# Patient Record
Sex: MALE | Race: WHITE
[De-identification: names, ages, dates, MRNs, and addresses within clinical notes are randomized per-mention and may not be internally consistent; named-entity substitution may affect disease eponyms.]

---

## 2022-03-28 ENCOUNTER — HOSPITAL ENCOUNTER (EMERGENCY)
Dept: HOSPITAL 95 - ER | Age: 82
Discharge: HOME | End: 2022-03-28
Payer: MEDICARE

## 2022-03-28 VITALS — HEIGHT: 68 IN | WEIGHT: 164.99 LBS | BODY MASS INDEX: 25.01 KG/M2

## 2022-03-28 DIAGNOSIS — F91.8: Primary | ICD-10-CM

## 2022-03-28 DIAGNOSIS — Z59.00: ICD-10-CM

## 2022-03-28 LAB
ALBUMIN SERPL BCP-MCNC: 3.7 G/DL (ref 3.4–5)
ALBUMIN/GLOB SERPL: 1.2 {RATIO} (ref 0.8–1.8)
ALT SERPL W P-5'-P-CCNC: 23 U/L (ref 12–78)
ANION GAP SERPL CALCULATED.4IONS-SCNC: 6 MMOL/L (ref 6–16)
APAP SERPL-MCNC: <2 UG/ML (ref 10–30)
AST SERPL W P-5'-P-CCNC: 37 U/L (ref 12–37)
BASOPHILS # BLD AUTO: 0.05 K/MM3 (ref 0–0.23)
BASOPHILS NFR BLD AUTO: 0 % (ref 0–2)
BILIRUB SERPL-MCNC: 1.7 MG/DL (ref 0.1–1)
BUN SERPL-MCNC: 13 MG/DL (ref 8–24)
CALCIUM SERPL-MCNC: 9.1 MG/DL (ref 8.5–10.1)
CHLORIDE SERPL-SCNC: 103 MMOL/L (ref 98–108)
CO2 SERPL-SCNC: 29 MMOL/L (ref 21–32)
CREAT SERPL-MCNC: 1.02 MG/DL (ref 0.6–1.2)
DEPRECATED RDW RBC AUTO: 49.1 FL (ref 35.1–46.3)
EOSINOPHIL # BLD AUTO: 0.01 K/MM3 (ref 0–0.68)
EOSINOPHIL NFR BLD AUTO: 0 % (ref 0–6)
ERYTHROCYTE [DISTWIDTH] IN BLOOD BY AUTOMATED COUNT: 13.3 % (ref 11.7–14.2)
ETHANOL SERPL-MCNC: <3 MG/DL
GLOBULIN SER CALC-MCNC: 3 G/DL (ref 2.2–4)
GLUCOSE SERPL-MCNC: 100 MG/DL (ref 70–99)
HCT VFR BLD AUTO: 45.9 % (ref 37–53)
HGB BLD-MCNC: 15.1 G/DL (ref 13.5–17.5)
IMM GRANULOCYTES # BLD AUTO: 0.06 K/MM3 (ref 0–0.1)
IMM GRANULOCYTES NFR BLD AUTO: 1 % (ref 0–1)
LYMPHOCYTES # BLD AUTO: 1.06 K/MM3 (ref 0.84–5.2)
LYMPHOCYTES NFR BLD AUTO: 8 % (ref 21–46)
MCHC RBC AUTO-ENTMCNC: 32.9 G/DL (ref 31.5–36.5)
MCV RBC AUTO: 100 FL (ref 80–100)
MONOCYTES # BLD AUTO: 1.21 K/MM3 (ref 0.16–1.47)
MONOCYTES NFR BLD AUTO: 10 % (ref 4–13)
NEUTROPHILS # BLD AUTO: 10.21 K/MM3 (ref 1.96–9.15)
NEUTROPHILS NFR BLD AUTO: 81 % (ref 41–73)
NRBC # BLD AUTO: 0 K/MM3 (ref 0–0.02)
NRBC BLD AUTO-RTO: 0 /100 WBC (ref 0–0.2)
PLATELET # BLD AUTO: 192 K/MM3 (ref 150–400)
POTASSIUM SERPL-SCNC: 3.9 MMOL/L (ref 3.5–5.5)
PROT SERPL-MCNC: 6.7 G/DL (ref 6.4–8.2)
PROT UR STRIP-MCNC: (no result) MG/DL
RBC #/AREA URNS HPF: (no result) /HPF (ref 0–2)
SALICYLATES SERPL-MCNC: <1.7 MG/DL (ref 2.8–20)
SODIUM SERPL-SCNC: 138 MMOL/L (ref 136–145)
SP GR SPEC: 1.01 (ref 1–1.02)
UROBILINOGEN UR STRIP-MCNC: (no result) MG/DL
WBC #/AREA URNS HPF: (no result) /HPF (ref 0–5)

## 2022-03-28 PROCEDURE — G0480 DRUG TEST DEF 1-7 CLASSES: HCPCS

## 2022-03-28 SDOH — ECONOMIC STABILITY - HOUSING INSECURITY: HOMELESSNESS UNSPECIFIED: Z59.00

## 2022-03-29 ENCOUNTER — HOSPITAL ENCOUNTER (INPATIENT)
Dept: HOSPITAL 95 - ER | Age: 82
LOS: 44 days | DRG: 177 | End: 2022-05-12
Attending: FAMILY MEDICINE | Admitting: INTERNAL MEDICINE
Payer: MEDICARE

## 2022-03-29 VITALS — BODY MASS INDEX: 19.56 KG/M2 | HEIGHT: 66 IN | WEIGHT: 121.7 LBS

## 2022-03-29 DIAGNOSIS — Z28.21: ICD-10-CM

## 2022-03-29 DIAGNOSIS — J96.01: ICD-10-CM

## 2022-03-29 DIAGNOSIS — E83.39: ICD-10-CM

## 2022-03-29 DIAGNOSIS — F02.81: ICD-10-CM

## 2022-03-29 DIAGNOSIS — Z66: ICD-10-CM

## 2022-03-29 DIAGNOSIS — I50.30: ICD-10-CM

## 2022-03-29 DIAGNOSIS — D69.6: ICD-10-CM

## 2022-03-29 DIAGNOSIS — G92.8: ICD-10-CM

## 2022-03-29 DIAGNOSIS — G31.83: ICD-10-CM

## 2022-03-29 DIAGNOSIS — I35.0: ICD-10-CM

## 2022-03-29 DIAGNOSIS — Z51.5: ICD-10-CM

## 2022-03-29 DIAGNOSIS — M62.82: ICD-10-CM

## 2022-03-29 DIAGNOSIS — Z78.1: ICD-10-CM

## 2022-03-29 DIAGNOSIS — J69.0: Primary | ICD-10-CM

## 2022-03-29 DIAGNOSIS — I95.9: ICD-10-CM

## 2022-03-29 DIAGNOSIS — Z59.00: ICD-10-CM

## 2022-03-29 LAB
RBC #/AREA URNS HPF: (no result) /HPF (ref 0–2)
SP GR SPEC: 1.02 (ref 1–1.02)
UROBILINOGEN UR STRIP-MCNC: (no result) MG/DL
WBC #/AREA URNS HPF: (no result) /HPF (ref 0–5)

## 2022-03-29 PROCEDURE — G0378 HOSPITAL OBSERVATION PER HR: HCPCS

## 2022-03-29 PROCEDURE — A9270 NON-COVERED ITEM OR SERVICE: HCPCS

## 2022-03-29 SDOH — ECONOMIC STABILITY - HOUSING INSECURITY: HOMELESSNESS UNSPECIFIED: Z59.00

## 2022-03-30 NOTE — NUR
PT ARRIVED TO THE MEDICAL FLOOR FROM THE ER VIA WHEELCHAIR, A/OX3. PT IS
ADMIITED  FOR SAFE PLACEMENT. THE PT WAS ORIENTED TO THE ROOM LAYOUT AND
CALL SYSTEM. CALL LIGHT IN REACH. PT SO FAR HAS BEEN COOPERATIVE WITH STAYING.
THE PTS DAUGHTER WAS IN EARLIER WITH PAPERS FROM APS FOR THE PT TO SIGN. PT
APPEARS TO BE BREATHING EASILY ON RA AT THIS TIME. PT TOOK HIS BOOTS OFF FOR A
BED WEIGHT THEN PUT THEM RIGHT BACK ON. CALL LIGHT IN REACH. WILL CONTINUE TO
MONITOR AND ASSESS FOR CHANGES

## 2022-03-31 NOTE — NUR
PT CALM & COOPERATIVE THROUGHOUT SHIFT. EASILY REDIRECTED TO RETURN TO HIS OWN
ROOM WHEN NEEDED. REMAINED IN HIS OWN CLOTHES, REFUSING TO CHANGE INTO
HOSPITAL GOWN & HAVE HIS BLOOD DRAWN TODAY FOR LABS THAT MD ORDERED. MD
INITIATED GUARDIANSHIP TODAY. APS IN TODAY TO SEE PT. PT SIGNED [employer]
PAPERWORK THIS EVENING, COPY GIVEN TO PT. VSS TODAY. USES RESTROOM
INDEPENDENTLY.
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
## 2022-03-31 NOTE — NUR
IN ROOM TO DRAW MD ORDERED LABS. PT IS REFUSING BEING POKED. WILL TRY
AGAIN LATER PER PT AGREEMENT.

## 2022-03-31 NOTE — NUR
AGRESSIVE BEHAVIORS WHEN APPROACHED FOR VITAL SIGNS CHECK PT EXTREMELY
AGGITATED WITH BEEPING FROM MACHINE ALARMS & SQUEEZING OF bp CUFF & HE WAS
STRIKING OUT AT EQUIPMENT & STARTED YELLING DONT HURT HIM. HE HAD ALLOWED
VITAL SIGNS LAST NIGHT BUT HAD EXPRESSED FEAR & DISTRUST ABOUT LAB DRAWS & SO
STAFF WAS REMOVED FROM CLOSE CONTACT WITH PT & LAB DRAW NOT ATTEMPTED DUE TO
AGRESSIVE BEHAVIOR WITH VITALS. PT WAS REDIRECTABLE WITH CUES BUT HE TREN
STARTED HOWLING LOUDLY WHICH STOPPED WITH REDIRECTION. PT EXPRESSED PAIN BUT
DECLINE PAIN MEDS. PTS RT HAND KNUCKLES APPEAR SWOLLEN & BRUISED BUT HE HAS
BEEN USING IT. NOW RESTING QUIETLY ON BED. pt HAS NOT BEEN TAKING MUCH LIQUIDS
OR ANY ORAL INTAKE OBSERVED THIS 12 HOUR SHIFT. oFFERED WATER SUPPLEMENTS &
SET UP BUT TAKEN POORLY. POSTED SIGNS ASKING STAFF TO SPEAK WITH NURSE PRIOR
TO ENTERING DUE TO POTENTIAL FOR AGRESSION WITH VIOLENCE.

## 2022-04-01 LAB
ANION GAP SERPL CALCULATED.4IONS-SCNC: 3 MMOL/L (ref 6–16)
BASOPHILS # BLD AUTO: 0.06 K/MM3 (ref 0–0.23)
BASOPHILS NFR BLD AUTO: 1 % (ref 0–2)
BUN SERPL-MCNC: 15 MG/DL (ref 8–24)
CALCIUM SERPL-MCNC: 9 MG/DL (ref 8.5–10.1)
CHLORIDE SERPL-SCNC: 102 MMOL/L (ref 98–108)
CO2 SERPL-SCNC: 31 MMOL/L (ref 21–32)
CREAT SERPL-MCNC: 0.87 MG/DL (ref 0.6–1.2)
DEPRECATED RDW RBC AUTO: 49.1 FL (ref 35.1–46.3)
EOSINOPHIL # BLD AUTO: 0.22 K/MM3 (ref 0–0.68)
EOSINOPHIL NFR BLD AUTO: 3 % (ref 0–6)
ERYTHROCYTE [DISTWIDTH] IN BLOOD BY AUTOMATED COUNT: 13.2 % (ref 11.7–14.2)
GLUCOSE SERPL-MCNC: 111 MG/DL (ref 70–99)
HCT VFR BLD AUTO: 47.3 % (ref 37–53)
HGB BLD-MCNC: 16 G/DL (ref 13.5–17.5)
IMM GRANULOCYTES # BLD AUTO: 0.02 K/MM3 (ref 0–0.1)
IMM GRANULOCYTES NFR BLD AUTO: 0 % (ref 0–1)
LYMPHOCYTES # BLD AUTO: 2.35 K/MM3 (ref 0.84–5.2)
LYMPHOCYTES NFR BLD AUTO: 32 % (ref 21–46)
MCHC RBC AUTO-ENTMCNC: 33.8 G/DL (ref 31.5–36.5)
MCV RBC AUTO: 99 FL (ref 80–100)
MONOCYTES # BLD AUTO: 0.71 K/MM3 (ref 0.16–1.47)
MONOCYTES NFR BLD AUTO: 10 % (ref 4–13)
NEUTROPHILS # BLD AUTO: 4.1 K/MM3 (ref 1.96–9.15)
NEUTROPHILS NFR BLD AUTO: 55 % (ref 41–73)
NRBC # BLD AUTO: 0 K/MM3 (ref 0–0.02)
NRBC BLD AUTO-RTO: 0 /100 WBC (ref 0–0.2)
PLATELET # BLD AUTO: 229 K/MM3 (ref 150–400)
POTASSIUM SERPL-SCNC: 3.9 MMOL/L (ref 3.5–5.5)
SODIUM SERPL-SCNC: 136 MMOL/L (ref 136–145)
TSH SERPL DL<=0.005 MIU/L-ACNC: 0.97 UIU/ML (ref 0.36–4.8)

## 2022-04-01 NOTE — NUR
SHIFT SUMMARY:
PT A/O TO SELF, IND WITH AMBULATION AND STEADY ON FEET. PT PLEASANT WITH CARES
BUT NEEDED FREQUENT RE-ORIENTATION TO SETTING/SITUATION. PT DID WANDER HALLS
AND OCCASIONALLY NEEDED TO BE RE-DIRECTED AWAY FROM OTHERS ROOM, HE WAS
COMPLIANT AND COOPERATIVE ONCE HE UNDERSTOOD IT WAS NOT HIS ROOM. PT WAS
CONTINENT T/OUT THE DAY. PT SHOWERED TODAY. HE ATE MEALS WELL. PT REPORTED TO
SUNDOWN. PT GIVEN SEROQUEL IN AFTERNOON AND TOLERATED WELL. PT ENCOURAGED TO
DRINK FLUIDS T/OUT THE DAY.

## 2022-04-01 NOTE — NUR
SHIFT SUMMARY:
 
PT IS A/OX1. PT KNOWS SELF, BUT NOT TIME, PLACE, OR SITUATION. 2 MD HOLD. HE
IS NOT TOO EASY TO REDIRECT AND WANDERS INTO OTHER PATIENT'S ROOMS. THE PT HAS
TROUBLE FOLLOWING SIMPLE INSTRUCTIONS, AT THE SAME TIME, HE DID GET SOME
REST/SLEEP THIS NOC SHIFT. WE'RE STILL UNABLE TO GET PT OUT OF PERSONAL
CLOTHING AND HE REFUSED HIS 2100 SEROQUEL. HE DID NOT EXHIBIT ANY AGGRESSIVE
BEHAVIOR. HE HAS BEEN STEADY, VSS, AND NO C/O. WE'LL WILL CONTINUE TO MONITOR
THE REMAINDER OF THE SHIFT.

## 2022-04-02 NOTE — NUR
SHIFT SUMMARY
PT AXO TO SELF ONLY, CONFUSED AND FORGETFUL. COOPERATIVE WITH CARE THOUGHOUT
THE SHIFT. VSS. PT DENIES PAIN, SOB AND NV. PT UP AD [place] WANDERING AROUND IN
ROOM AND IN HALLWAYS. NO ACUTE CHANGES THIS SHIFT. PLEASANT. BED IN LOW
POSITION, CALL LIGHT WITHIN REACH.

## 2022-04-02 NOTE — NUR
PT IS A/OX SELF. HE IS NOW A 2 MD HOLD STARTING 4/1. HE CONTINUES TO BE
IMPUSLSIVE AND CONFUSED. HE HAS NOT EXHIBITED ANY AGGRESSION. THE PT WANDERS
THE HALLWAY AND NEEDS FREQUENT REDIRECTION. THE PT IS ON RA, NO TELE, AND NO
IV. WE WILL CONTINUE TO MONITOR THE REMAINDER OF THE SHIFT.

## 2022-04-03 NOTE — NUR
END OF SHIFT SUMMARY:
PATIENT DENIED PAIN OR DISCOMFORT THROUGHOUT THE SHIFT. PATIENT INDEPENDENT IN
THE ROOM AND STEADY ON HIS FEET. PATIENT EXPRESSED SOME PARANOID THOUGHTS
ABOUT MEDICATION BEING SNEAKED IN HIS FOOD. DISCUSSED HOW WE ADMINISTER
MEDICATIONS AND HIS RIGHTS AS A PATIENT. PATIENT SEEMED SATISFIED WITH
RESPONSE AND CONTINUED TO EAT AND DRINK.

## 2022-04-03 NOTE — NUR
Patient refuses to take his seroquel tonight. Educated patient about this
medication, but he is very confuse and says "I do not want to take
medications."

## 2022-04-03 NOTE — NUR
Patient is alert and oriented to self only. He does not always follow commands
and cannot answer questions correctly. Patient is ambulatory, gait steady. He
seldom wanders outside his room, but is redirectable. Patient sleep walks and
urinated on the floor twice. He gets very agitated when he is being told to
stay in bed. Snacks provided. Skin intact. No complains of pain. No signs of
SOB.Call light wiihin reach.Snacks provided, poor food intake.

## 2022-04-04 NOTE — NUR
END OF SHIFT SUMMARY:
PATIENT DENIED PAIN OR DISCOMFORT THROUGHOUT THE SHIFT. PATIENT CALM AND
COOPERATIVE. PATIENT OUT OF HIS ROOM TO TALK WITH STAFF OR OTHER PATIENTS IN
THE LAUGHLIN. WHEN REDIRECTED BACK TO HIS ROOM, PATIENT COMPLIED WITHOUT
COMPLAINT. PATIENT CONTINUES TO BE STEADY ON FEET. PATIENT CONTINUES TO BE
ORIENTED TO SELF ONLY.
PATIENT'S DAUGHTER CAME TO VISIT THE PATIENT TODAY. PATIENT CALM THROUGHOUT
VISIT AND AFTER SHE LEFT.

## 2022-04-04 NOTE — NUR
Patient is alert and oriented to self only. He is more calm today and
redirectable. Still very confuse on situation and place. He cannot answer
questions appropriately. He refuses his seroquel tonight. Snacks provided.
Patient is sleeping most of the night. Call light within reach.

## 2022-04-05 NOTE — NUR
PT PLEASANTLY CONFUSED TODAY. AWARE OF SELF AND DAUGHTER. 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
PAST AS A RANCHER. WAS SERVED PAPERS TODAY. RAISED HIS ANXIETY SOME.
CONTINUES TO AMBULATE SELF ABOUT ROOM AND HALLWAYS. NO NEW CONCERNS NOTED. BED
IN LOW POSITION, CALL LITE IN REACH, WALKS TO LAUGHLIN FOR NEEDS.

## 2022-04-05 NOTE — NUR
PT PLEASANT ALERT TO SELF DENIES PAIN. HE IS DRESSED IN PJ BOTTOM AND BOOTS.
ASKING ASSISTANCE IN GETTING JACKET ON. H/R REG, RICCO NOTED. NO TELE. LUNGS
CLEAR, REP EASY, UNLABORED. ON R/A. BT X4 LAST BM DAY OR TWO PER PT. VOIDS
INDEPENDANT TO BATHROOM. INDEPENDANT IN ROOM AND LAUGHLIN. BED IN LOW POSITION,
TRAVIS LITE IN REACH, WALKS TO DOOR FOR NEEDS.

## 2022-04-05 NOTE — NUR
SUMMARY: PT ORIENTED TO SELF ONLY, IS PLEASANT AND COOPERATIVE W/CARE AND
INDEPENDENT IN ROOM. HE SLEPT LATER IN THE SHIFT BUT WANDERS INTO HALLS
OFTEN TO VISIT W/STAFF AND OTHER PT'S. HE'S REDIRECTABLE AND COMPLIANT
W/INTRUCTION BUT IS VERY FORGETFULL AND REQUIRES FREQUENT REMINDERS RE: WHICH
ROOM IS HIS, SITUATION, SURROUNDINGS AND PLAN. NO ACUTE CHANGES, VSS/AFEBRILE.
2MD HOLD REMAINS IN PLACE. WCTM AND REPORT TO DAY RN.

## 2022-04-06 NOTE — NUR
SUMMARY: PT A/O TO SELF AND DAUGHTER. HE'S PLEASANT AND COOPERATIVE W/CARE BUT
IS FORGETFULL AND WANDERS INDEPENDENTLY IN ROOM AND HALLWAY. PT MAKES NEEDS
KNOWN BUT DOES REQUIRE REDIRECTION AT TIMES, PARTICULARLY RE: SITUATION AND TO
FIND HIS WAY BACK TO HIS ROOM. SNACKS AND DRINKS PROVIDED PER REQUEST BUT PT
CONT'S TO DENY HS SEROQUEL. NO ACUTE CHANGES, VSS/AFEBRILE. GUARDIANSHIP IN
PROGRESS AND PLACEMENT IS PENDING. WCTM AND REPORT TO DAY RN.

## 2022-04-07 NOTE — NUR
NO ACUTE CHANGES PT AOX2 WITH CONFUSION. PT INDEPENDENT AND WALKS AROUND. PT
WILL TRY TO GO OUT LAUGHLIN DOOR AT TIMES. PT TRYS TO WALK INTO OTHER ROOMS AS HE
WILL NOT REMEMBER WHERE HIS ROOM IS. PT IS REDIRECTABLE AND SEEMS PLEASANT.
WILL CONTINUE TO MONTIOR CALL LIGHT IS WITHIN REACH.

## 2022-04-07 NOTE — NUR
PT VERY AGITATED WHEN ASKED IF HE WANTS TO TAKE HIS NIGHT MEDICATION. PT SAYS
"MAYBE LATER" EVERYTIME I ASK. HE IS UPSET WITH STAFF SAYING WE "STOLE MY
BEDDING" BUT CANNOT TELL ME WHAT HE WANTS. PT GIVEN WARM BLANKET AND NEW GOWN
BUT THROWS THEM ON THE BED. PT ASKED TO STAY IN HIS ROOM DUE TO CONSTANT
WANDERING. HE IS CURRENTLY LYING ON THE FLOOR ON THE FAR SIDE OF THE BED.

## 2022-04-07 NOTE — NUR
SHIFT SUMMARY:
 
PT IS A/OX SELF. HE HAS BEEN PLEASANT AND COOPERATIVE WITH MOST CARE. THE PT
REFUSED HIS 2100 SEROQUEL. HE DOES NEED REDIRECTION TO STAY OUT OF OTHER
PATIENT'S ROOMS AND HELP BACK TO HIS ROOM. HE ALSO REQUIRES SPECIFIC
INSTRUCTIONS TO GET HIS TO TH BATHROOM, EVEN IF HE IS IN HIS ROOM. THERE HAVE
BEEN NO OTHER CHANGES TO REPORT.

## 2022-04-08 NOTE — NUR
NO ACUTE CHANGES PT AOX2 AND CONFUSED. PT INDEPENDENT AND WALKS AROUND.PT
NEEDS DIRECTED TO HIS OWN ROOM AS HE DOES NOT REMEMBER WHERE HIS ROOM IS. NO
DISTRESS NOTED AT THIS TIME WILL CONTINUE TO MONITOR.

## 2022-04-08 NOTE — NUR
NIGHT SHIFT SUMMARY
 
PT ADMITTED FOR DEMENTIA WITH BEHAVIORAL DISTURBANCE. PT IS FULL CODE. HE IS
AWAITING GUARDIANSHIP AND PLACEMENT. PT HAS BEEN WANDERING THE HALLWAY ALL
NIGHT AND ENTERING OTHER PT'S ROOM. PT REDIRECTABLE BUT DOES NOT FOLLOW
DIRECTIONS WELL. VERY FORGETFUL. PT REFUSING TO SLEEP IN BED SO HAS SLEPT ON
THE FLOOR AND AGAINST THE WALL. HE REFUSED HIS NIGHT MEDICATION, WANTING TO
"ONLY TAKE HALF". PT BECOMING MORE AGITATED AND ONLY RESPONDING TO DIRECT
ORDERS AT THIS TIME. (3) occasionally moist

## 2022-04-08 NOTE — NUR
PT WAS FOUND TO HAVE LEFT HIS ROOM WHILE I WAS HELPING WITH ANOTHER PATIENT.
HE WAS FOUND IN ROOM 353 IN THE RECLINER WITH THE PATIENT OF ROOM 353 IN HER
BED. PT WAS REMOVED AND GUIDED BACK TO HIS ROOM. HE WAS TOLD THAT HE IS NO
LONGER ALLOWED TO LEAVE HIS ROOM AS HE DOES NOT FOLLOW DIRECTIONS ABOUT
STAYING OUT OF OTHER PATIENT'S ROOMS. HE TELLS ME "BUT I DON'T TAKE ANYTHING"
AND IS AGAIN TOLD HE NEEDS TO STAY OUT OF OTHER ROOMS. PT CURRENTLY IN HIS
ROOM WITH DOOR CLOSED, SITTING ON AN UPSIDE DOWN TRASH CAN.

## 2022-04-09 NOTE — NUR
SUMMARY
 
PT SITTING UP EATING DINNER, PT HAS BEEN PLEASANT AND COOPERATIVE WITH CARE
T/O THE DAY, WANDERS IN THE ROOM AND IN THE LAUGHLIN, PT HAD A SHOWER TODAY, HAS
DENIED ANY PAIN OR SOB, CARE MANAGEMENT WORKING ON A DISCHARGE PLAN, VSS, WILL
CONT TO MONITOR

## 2022-04-09 NOTE — NUR
SHIFT SUMMARY
81 YR M ADMITTED ON 3/30/22 FOR [facility]. FULL CODE. NO ACUTE CHANGES THIS
SHIFT. PT IS CONFUSED AT TIMES BUT IS COOPERATIVE AND FOLLOWS DIRECTIONS. HE
DID REFUSE HIS EVENING MED BECAUSE HE STATED THAT HE HAD ALREADY TAKEN IT. I
ASSURED HIM THAT HE DID NOT, BUT HE STILL REFUSED. HE LIKES TO WANDER IN THE
HALLWAY AND WATCH WHAT IS GOING ON W/ THE NURSING STAFF AND OTHER PT'S. HE HAS
TO BE REMINDED ON OCCASION TO NOT WANDER IN THE ROOMS OF OTHER PT'S.

## 2022-04-10 NOTE — NUR
SHIFT SUMMARY
81 YR M ADMITTED ON 3/30/22 FOR DIMENTIA AND AGRESSIVE, DANGEROUS BEHAVIOR.
FULL CODE. NO ACUTE CHANGES THIS SHIFT. PT SEEMED MORE CONFUSED TODAY THAN THE
DAY BEFORE. HE HAD TO BE CONTINUALLY REDIRECTED BACK TO HIS OWN ROOM AFTER
TRYING TO ENTER THE ROOMS OF OTHERS. HE REFUSED TO TAKE ANY MEDS AND
REARRANGED THE THINGS IN HIS ROOM SEVERAL TIMES. HE WAS RESTLESS AND DID NOT
SLEEP THIS SHIFT. HE ASKED SEVERAL TIMES HOW TO GET OUT OF HERE AND STATED
THAT HE NEEDED TO LEAVE BECAUSE HE HAD LOTS OF THINGS TO DO AND TAKE CARE OF.
HE IS PRETTY EASILY REDIRECTED AND IS COOPERATIVE FOR THE MOST PART.

## 2022-04-10 NOTE — NUR
SUMMARY
 
PT SITTING IN A CHAIR IN THE LAUGHLIN, PT HAS BEEN UP WANDERING THE HALLS AND HIS
ROOM FOR MOST OF THE DAY, OCC WANDERS INTO OTHER PT'S ROOMS, EASILY
REDIRECTABLE, PLEASANT AND COOPERATIVE, CARE MANAGEMENT WORKING ON A SAFE
DISCHARGE PLAN, DAUGHTER WITH GUARDIANSHIP, VSS, WILL CONT TO MONITOR

## 2022-04-11 NOTE — NUR
SHIFT SUMMARY
81 YR M ADMITTED ON 3/30/22 FOR ADVANCED DIMENTIA WITH BEHAVIORAL ISSUES. FULL
CODE. PT HAS BEEN CONFUSED AND HAS TO CONTINUALLY BE REDIRECTED TO HIS OWN
ROOM. HE WANDERS THE HALLS AND IS PLEASANT AND COOPERATIVE, HE JUST FORGETS
WHERE HIS OWN ROOM IS AND TENDS TO WANDER INTO THE ROOMS OF OTHERS. HE DID
HAVE AN EPISODE OF CONFUSION WHEN HE WOKE UP IN THE MIDDLE OF THE NIGHT AND
URINATED ON THE FLOOR IN HIS BEDROOM. HE WENT BACK TO SLEEP AFTER THAT.

## 2022-04-11 NOTE — NUR
pt has been wondering the halls all day, attempting to take out the trash and
such. he has attempted to go into other rooms, but is easily redirected, no
acute changes this shift. call light in reach.

## 2022-04-11 NOTE — NUR
pt wondering in the halls, cooperative with care, follows commands, gait noted
to be steady. call light in reach when in room.

## 2022-04-12 NOTE — NUR
Pt laying in bed awake a/ox3, pleasant and cooperative with care, follows
commands well, denies pain, sitting in chair eating breakfast, lungs are clear
dim in bases, resp even and unlabored, no cough noted, hrr, no edema noted,
btx4, abd flat soft nontender, voids without diff, skin c/w/d, maew, farrukh,
call light in reach.

## 2022-04-12 NOTE — NUR
SHIFT SUMMARY
AOXSELF ONLY. UNABLE TO ANSWER REST OF ORIENTATION QUESTIONS CORRECTLY. HAS
TANGETABLE, OFF TOPIC RESPONSES TO QUESTIONS. PLEASENT & COOPERATIVE. VERY
FORGETFUL, NEEDS FREQUENT REMINDING WHERE HIS ROOM IS LOCATED. REFUSED HS
SEROQUEL, STATES "I DONT WANT TO TAKE ANYTHING UNTIL I GET THE OKAY FROM THE
DR." INFORMED PT DR IS THE ONE WHO ORDERED MEDICATION, HE IS ANXIOUS & DOESNT
WANT TO TAKE IT. KNOWS HE IS FORGETFUL & APOLOGIZES FOR BEING SO. PLEASENT &
COOPERATIVE. REPORTS "IS THAT MAN OVER THERE GOING TO LEAVE MY ROOM?" NOONE
ELSE WAS IN PT RM BESIDES THIS NURSE & PT, VISIAL HALLUCINATIONS. VSS. DENIES
PAIN, N/V OR DYSPNEA. AWAITING PLACEMENT. CALL LIGHT IN REACH. WILL MONITOR.

## 2022-04-12 NOTE — NUR
pt has stayed in his room most of the day today, came out to sit in the pal
for a bit. has remained calm and cooperative with care, no acute changes this
shift. call light in reach.

## 2022-04-13 NOTE — NUR
SHIFT SUMMARY
 
PT HAS BEEN SITTING IN THE HALLWAY WATCHING OTHER PTS MOST OF THE DAY. ONCE I
CHANGED HIS TRAYS TO FINGER FOODS HE WAS ABLE TO EAT MORE OF HIS MEALS. HE  IS
EASILY REDIRECTED WHEN WANDERING IN THE HALLS. HE HASA HAD SOME PARANOIA ABOUT
HIS MEALS AND STAYING IN HIS ROOM, RESPONDS WELL TO REASSURANCE OF SAFETY.
WILL CONTINUE TO MONITOR.
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
## 2022-04-14 NOTE — NUR
SHIFT SUMMARY
PT IS AN 80 Y/O MALE, ADMITTED FOR DEMENTIA WITH BEHVIOURAL DISTURBANCE. HE IS
A&O X SELF, UP INDEPENDENTLY AND WANDERS IN THE HALLWAY. PLEASANT AND EASILY
REDIRECTABLE.  NO C/O ACUTE PAIN, NAUSEA OR SOB. VITAL SIGNS STABLE. NO OTHER
ACUTE CHANGES IN PT CONDITION NOTED DURING THE NIGHT. WILL CONTINUE TO MONITOR
AND TREAT PER EMAR UNTIL HAND OFF TO DAY SHIFT RN.

## 2022-04-14 NOTE — NUR
SHIFT SUMMARY
 
PT HAS BEEN SITTING OUT IN THE LAUGHLIN PEACFULLY TALKING WITH THE OTHER PATIENTS.
HE TOOK A SHOWER THIS MORNING. HE IS STILL SUSPICIOUS ABOUT EATING ICE CREAM
OF PUDDING, AS HE THINKS IT HAS BEEN SPIKED WITH MEDICATIONS, BUT IS WILLING
TO EAT ALL THE FOOD THAT COMES ON HIS TRAY. HE IS PLEASANT AND COOPERATIVE.
WILL CONTINUE TO MONITOR.

## 2022-04-15 NOTE — NUR
PT ANXIOUS DEMONSTRATED BY PARANOID AND RESTLESS BEHAVIOR. PT IS CONFUSED,
ORIENTED TO SELF ONLY. WONDERING FREQUENTLY OUT OF HIS ROOM, DOWN THE LAUGHLIN,
AND SOMETIMES INTO
OTHER ROOMS. PT EASILY DISTRACTED AND REDIRECTED. PT RELUCTANT
TO TOLERATE MEDS, EXHIBITS SUSPICION OF MEDS AND NURSING INTERVENTIONS. PT
RELUCTANT TO GO TO BED AND REFUSED HELP TO TAKE OFF BOOTS. PT TO BED AT 2300
AND GOT OUT OF BED 1X IN THE MIDDLE OF THE NIGHT. PT REDIRECTED BACK TO BED
AND RESTED PEACEFULLY.

## 2022-04-15 NOTE — NUR
SHIFT SUMMARY:
 
NO ACUTE EVENTS. SLEPT WELL O/N. CONFUSED TODAY, TRIED TO ESCAPE FROM SCU
TWICE THIS SHIFT. AMBULATING INDEPENDENTLY IN HALLWAY. TOLERATING PO
INTAKE. DENIED PAIN. AWAITING PLACEMENT.

## 2022-04-16 NOTE — NUR
PATIENT WALKING INTO AND OUT OF OTHER PEERS ROOMS. HE REDIRECTS BUT THEN
RETURNS TO WHAT HE HAD BEEN DOING. PRN SEROQUEL 50 MG TABLET WAS PULLED, AND
CUT IN HALF AS DOSE DIRECTS. PATIENT WAS REFUSING TO TAKE THE MEDICATION.
VERBAL ATTEMPTS BY STAFF TO HELP HIM TAKE THE MEDICATION. HE CUT THE HALF PILL
IN HALF AGAIN AND THEN TOOK THE 1/4 TABLET-
 (6.25MG). DOCUMENTED IN MAR AS SUCH.

## 2022-04-16 NOTE — NUR
PATIENT HAS BEEN WANDERING THIS SHIFT. HE FORGETS RIGHT AWAY OF THE DIRECTION
HE WAS GIVEN. PATIENT HAS NOT BEEN AGGRESSIVE, OR ATTEMPTED TO HARM ANYONE. HE
SEEMS PARANOID WITH MEDICATIONS AND DOESN'T WANT TO TAKE THEM. HE APPEARS TO
SEE THINGS THAT AREAN'T THERE, AS HE REACHES DOWN TO THE FLOOR AND TRIES TO
PICK (UNSEEN) THINGS UP. ALERT AND OREINTATED TO SELF. PHYSICALLY, HE GETS
AROUND GREAT- STEADY ON FEET.

## 2022-04-16 NOTE — NUR
SHIFT SUMMARY
PATIENT HAD NO ACUTE CHANGES. ALERT TO SELF AND INDEPENDENT IN ROOM AND
HALLWAY. WALKED INTO MULTIPLE ROOMS AND FELL ASLEEP IN ONE ROOM IN CHAIR. EXIT
SCU X ONE AND REDIRECTED BACK IN. PARANOID ABOUT TAKING PO MEDICATION BEFORE
HE DID. VSS/AFEBRILE. DENIES PAIN, SOB, AND N/V. NO IV ACCESS. CALL LIGHT IN
REACH. BED IN LOWEST POSITION. WILL CONTINUE TO MONITOR UNTIL DAY SHIFT NURSE
ASSUMES CARE.

## 2022-04-17 NOTE — NUR
SHIFT SUMMARY
 
PATIENT IS PLEASENTLY CONFUSED. PATIENT IS ALERT AND ORIENTED TO SELF ONLY.
PATIENT IS IND IN ROOM AND OCCASIONALLY WALKS IN HALLWAY IND. PATIENT IS
OCCASIONALLY IN OTHER PATIENT ROOMS. PATIENT HAS HAD NO ACUTE EVENTS THIS
SHIFT. VITAL SIGNS REVIEWED. PATIENT DENIES PAIN, NAUSEA, SOB OR VOMITTING
THIS SHIFT. BED IN LOWEST POSITION. CALL LIGHT IN PLACE. WILL MONITOR UNTIL
SHIFT CHANGE.

## 2022-04-17 NOTE — NUR
SHIFT SUMMARY
PATIENT PLEASANTLY CONFUSED. AXO TO SELF. WALKING INDEPENDENTLY IN ROOM AND
HALLWAYS. AT TIMES ENTERS OTHER PATIENT ROOMS AND HAS TO BE REDIRECTED OUT.
GOING THROUGH GARBAGE CANS THIS SHIFT. PARANOID ABOUT TAKING A PO MED BUT
RELUCTANTLY DOES. VSS/AFEBRILE. DENIES PAIN, SOB, AND N/V. HALLUCINATION AT
TIMES REACHING FOR ITEMS ON FLOOR NOT THERE. CALL LIGHT IN REACH. BED IN
LOWEST POSITION. WILL CONTINUE TO MONITOR UNTIL DAY SHIFT NURSE ASSUMES CARE.

## 2022-04-18 NOTE — NUR
SHIFT SUMMARY
PT AxOx1- SELF. VERY CONFUSED, YET PLEASANT BEHAVIOR. VISUAL HALLUCINATIONS
NOTED. PT WANDERS LAUGHLIN ALL DAY, BUT IS USUALLY REDIRECTABLE. FAMILY MEMBERS IN
TODAY FOR VISITING. PT INDEPENDENT AND CONTINENT. DENIES PAIN THIS SHIFT.
VITALS REVIEWED. LOW BP THIS AM, RESOLVED BY PM VITALS. PT CURRENTLY WALKING
AROUND BACK LAUGHLIN. DENIES ANY NEEDS AT THIS TIME.

## 2022-04-18 NOTE — NUR
PATIENT BACK WALKING HALLS AND INTO OTHER PATIENT ROOMS. PATIENT REDIRECTED
BACK INTO HIS ROOM AND INSTRUCTED TO STAY IN HIS ROOM.

## 2022-04-18 NOTE — NUR
CHARGE NURSE REPORTS: PATIENT HAD WITNESSED FALL AND TOOKS VITALS, CALLED
HOSPITALIST, NOTIFIED SUPERVISOR-FAMILY AND DID POST FALL ASSESSMENT.

## 2022-04-18 NOTE — NUR
SHIFT SUMMARY
CHARGE RN REPORTED PATIENT FELL WHEN HE ATTEMPTED TO SIT ON SIDE OF BED AND
WASN'T CLOSE ENOUGH.  ALERT TO SELF AND INDEPENDENT IN ROOM AND HALLWAYS.
PATIENT IN/OUT OF OTHER PATIENT ROOMS T/O SHIFT. NOT ABLE TO REDIRECT FOR VERY
LONG. DENIES PAIN, SOB, AND N/V.  VISUAL HALLUCINATIONS SEEING OBJECTS ON
FLOOR AND TRYING TO PICKUP AND ASK OTHERS TO HELP HIM PICK THEM UP.
VSS/AFEBRILE. CALL LIGHT IN REACH. BED IN LOWEST POSITION. WILL CONTINUE TO
MONITOR UNTIL DAY SHIFT NURSE ASSUMES CARE.

## 2022-04-19 NOTE — NUR
PT CALM AND COOPERATIVE TODAY. REDIRECTABLE. A/O X2. DENIES PAIN. H/R IN 80'S,
MURMUR NOTED. NO TELE. ON ROOM AIR. LUNGS CLEAR BILATERALLY. BREATHING IS EASY
AND UNLABORED. USES BATHROOM INDEPENDENTLY. HAS BEEN PACING THE UNIT AND
TALKING WITH OTHER PATIENTS. WAS ABLE TO CHANGE PANTS. BED IN LOW POSITION,
CALL LIGHT IN REACH.

## 2022-04-19 NOTE — NUR
SHIFT SUMMARY
 
Patient slept on and off throughout the night, wandering noted multiple times
this shfit, redirection and reorientation provided frequently, patient remains
fairly cooperative however can be difficult to redirect at times, patient
cooperative with evening medications this shfit, VSS on RA, repositioning self
in bed, independent with ambulation, denies pain

## 2022-04-19 NOTE — NUR
PT CALM. PACING AROUND HALLS. A/O TO SELF. CONFUSED ON WHAT HE IS DOING HERE.
ALSO PT IS TALKING TO WALL AND SELF.  REDIRECTABLE. DENIES PAIN. NO TELE. H/R
REGULAR IN 60'S. MURMUR NOTED. LUNGS CLEAR BILATERALLY. BREATHING IS EASY AND
UNLABORED. PT STATED LAST BOWEL MOVEMENT WAS TWO DAYS AGO. USES BATHROOM
INDEPENDENTLY. AMBULATES EASILY. PT HAS A STRONG ODOR. PER AIDE PT SHOWERED
TWO DAYS AGO. DOES NOT WANT TO CHANGE CLOTHING. BED IN LOW POSTION, CALL LIGHT
IN REACH.

## 2022-04-20 NOTE — NUR
SUMMARY- PT UP INDEPENDANT, STEADY ON FEET. TOLERATING FOOD AND FLUID. HAD
SHOWER TODAY. REDIRECTED FREQ TRIES TO LEAVE UNIT AND "GO DOWNSTAIRS".
REDIRECTS EASILY. VSS. AWAITING PLACEMENT.

## 2022-04-20 NOTE — NUR
SHIFT SUMMARY
 
PATINT UP WALKING THE HALLS ON AND OFF THIS SHIFT, EASILY REDIRECTABLE,
COOPERATIVE AND PLEASANT, A&O X1, DENIES PAIN AND SHORTNESS OF BREATH,
 PATIENT REFUSED TO TAKE ENTIRE 25 MG SEROQUEL TAB, HOWEVER AGREED TO TAKE
 HALF AT 12.5MG, VSS ON RA

## 2022-04-21 NOTE — NUR
PATIENT IS ALERT AND ORIENTATED TO SELF. PATIENT IS IND IN ROOM. PATIENT HAS
HAD NO ACUTE EVENTS THIS SHIFT. PATIENT IS IND IN HALLWAYS AND WANDERS AND HAS
TO BE REDIRECTED TO HIS OWN ROOM. VITAL SIGNS REVIEWED. PATIENT IS PLEASENT
AND COOPERATIVE WITH CARE. BED IN LOCKED AND LOWEST POSITION. CALL LIGHT IN
PLACE. WILL MONITOR UNTIL SHIFT CHANGE.

## 2022-04-21 NOTE — NUR
SHIFT SUMMARY
 
PT ALERT, ORIENTED TO SELF ONLY, NO C/O PAIN, TALHA PO WELL, UP INDEPENDENTLY IN
ROOM AND HALLWAY.  PT WAS AWAKE UNTIL APPROX 0300, WANDERED THE LAUGHLIN
FREQUENTLY, OCCASIONALLY GOING INTO OTHER PT'S ROOMS.  PT REDIRECTED AND HAS
BEEN CALM, COOPERATIVE WITH DIRECTIONS.  VSS, ANTICIPATE D/C WHEN PLACEMENT
DETERMINED.  PT REFUSED HIS HS SEROQUEL PER MAR.

## 2022-04-22 NOTE — NUR
SHIFT SUMMARY
 
PATIENT IS ALERT AND ORIENTED TO SELF. PATIENT HAS BEEN IN HALLWAY MOST OF
DAY. SOMEWHAT REDIRECTABLE. PATIENT IS PLEASENT AND COOPERATIVE WITH CARE.
PATIENT IS A PLACEMENT ISSUE. PATIENT HAS HAD NO COMPLAINTS OF PAIN, NAUSEA,
SOB OR VOMITTING THIS SHIFT. NO ACUTE EVENTS THIS SHIFT. VITAL SIGNS REVIEWED.
BED IN LOCKED AND LOWEST POSITION. CALL LIGHT IN PLACE. WILL MONITOR UNTIL
SHIFT CHANGE.

## 2022-04-22 NOTE — NUR
PT AMBULATORY IN HALLS, COOPERATIVE WITH CARE, CONFUSED, BUT REDIRECTABLE.  PT
IS ALERT TO PERSON ONLY, AND FOLLOWS INSRUCTIONS GIVEN.  PT DENIES ANY
COMPLAINTS OF PAIN.  PT OFTEN FORGETS WHERE HIS ROOM IS, REDIRECTABLE.

## 2022-04-23 NOTE — NUR
SHIFT SUMMARY
 
NO ACUTE CHANGES THIS SHIFT.  PT HAS BEEN WALKING UP AND DOWN THE LAUGHLIN,
ENTERING OTHER PT'S ROOMS WITHOUT THEIR PERMISSION.  HE IS VERY CONFUSED,
AOX1.  HE IS PLEASANT AND COOPERATIVE WITH STAFF.  HE CAN USUALLY BE
REDIRECTED.  HE DOES TRY TO EXIT THE UNIT THROUGH THE FRONT DOORS FROM TIME TO
TIME.  HE DOES HAVE A DAUGHTER, ZULAY.  THE PLAN IS TO GET HIM PLACEMENT AND
THERE HAS NOT BEEN ANY NEW INFORMATION AS OF TODAY.

## 2022-04-23 NOTE — NUR
SHIFT SUMMARY - NO ACUTE CHANGES THIS SHIFT.  PT WAS AMBULATING IN THE HALLS
AT THE BEGINNING OF THE SHIFT UNTIL APPX 10PM, THEN AWOKE AGAIN AROUND 2AM.
PT HAS BEEN REDIRECTABLE, AND COOPERATIVE WITH STAFF.  WILL CONTINUE TO
MONITOR UNTIL AM SHIFT CHANGE.

## 2022-04-24 NOTE — NUR
DAY SHIFT SUMMARY
 
PT HAS NO ACUTE CHANGES THIS SHIFT.  PT HAS BEEN WANDERING AROUND THE LAUGHLIN,
CONFUSED.  HE SEEMS TO BE HAVING DELIUSIONS AND HALLUCINATIONS.  PT'S DAUGHTER
VISITED TODAY, SHE ENCOURAGED HIM TO SHOWER AND HE DID.  PT DENIES ANY PAIN.
AOX1.  PT TENDS TO SLEEP SITTING UP ON EITHER THE BED OR IN A CHAIR.  PT
ATTEMPTS TO LEAVE OUT THE FRONT DOORS, REDIRECTION OFTEN WORKS.  PT STILL
ATTEMPTING TO WONDER INTO OTHER PT'S ROOMS. HE FOLLOWS DIRECTIONS BUT OFTEN
TIMES REQUIRES MULTIPLE ATTEMPTS. WILL CONTINUE TO MONITOR AND ASSESS UNTIL
NOC SHIFT TRANSFER.

## 2022-04-24 NOTE — NUR
NIGHT SHIFT SUMMARY
 
PT AWAITING GUARDIANSHIP. HE HAS BEEN WANDERING THE LAUGHLIN FREQUENTLY AND NEEDS
MULTIPLE PROMPTS FOR REDIRECTION. HE IS HAVING VISUAL HALLUCINATIONS AND IS
SLIGHTLY PARANOID. PT IS ALERT AND ORIENTED TO SELF ONLY AND KNOWS THAT HE
WANTS TO LEAVE. HE IS WANDERING INTO OTHER PATIENT ROOMS SO IS GUIDED BACK TO
HIS ROOM. PT TOOK NIGHT MEDICATION WITH SOME PROMPTING AND HAS BEEN RESTING
INTERMITTENTLY. HE IS CURRENTLY SLEEPING ON A FOLDING CHAIR IN HIS ROOM. PT IS
INDEPENDENT.

## 2022-04-25 NOTE — NUR
PHYSICIAN CONTACT
 
PATIENT BEHAVIOR ESCALATING. PATIENT HALLUCINATING, GETTING AGGITATED WITH
STAFF. WILL NOT REDIRECT. DR. PRUITT NOTIFIED OF BEHAVIORS. MULTIPLE ATTEMPTS
TO GIVE PO SEROQUEL. ONE DOSE FINALLY GIVEN. DR. PRUITT TOLD THIS RN IT WAS
OKAY TO PULL ADDITIONAL DOSE. PATIENT CONTINUED TO BE AGGITATED. DAUGHTER
CALLED TO TRY TO DE-ESCALATE HIM.

## 2022-04-25 NOTE — NUR
SHIFT SUMMARY:
 
PT IS A/OX1. THE PT CONTINUES TO WANDER THE HALLWAY AND FREQUENTLY GOES IN
OTHER PT'S ROOMS. HE NEEDS CONSTANT REDIRECTION AND WAS MORE IRRITABLE THIS
SHIFT. THE PT DID NOT TAKE HIS SCHEDULED 2100 SEROQUEL. WE'LL CONTINUE TO
MONITOR THE REMAINDER OF THE SHIFT.

## 2022-04-25 NOTE — NUR
SHIFT SUMMARY
 
PATIENT DENIES PAIN, NAUSEA, AND SHORTNESS OF BREATH. PATIENT NAPPED ON AND
OFF TODAY, BUT SHORT NAPS. PATIENT FREQUENTLY ATTEMPTING TO GO IN OTHER
PATIENTS ROOM. PATIENT REDIRECTED. PATIENT GETS AGGITATED WHEN STAFF ATTEMPTS
TO REDIRECT TO HIS ROOM. PATIENT NOT FOLLOWING DIRECTIONS WELL, GETTING HARDER
TO REDIRECT PATIENT. PATIENT HAS BEEN TRYING TO TAKE OBJECTS IN HALLWAY. ASKED
MULTIPLE TIMES NOT TO TAKE THINGS OUT OF TRASH CANS. PATIENT HAS POOR PO
INTAKE, BUT DRINKS COFFEE WELL. PATIENT IS MOSTLY PLEASANT.

## 2022-04-26 LAB
ANION GAP SERPL CALCULATED.4IONS-SCNC: 3 MMOL/L (ref 6–16)
BASOPHILS # BLD AUTO: 0.04 K/MM3 (ref 0–0.23)
BASOPHILS NFR BLD AUTO: 0 % (ref 0–2)
BUN SERPL-MCNC: 12 MG/DL (ref 8–24)
CALCIUM SERPL-MCNC: 8.5 MG/DL (ref 8.5–10.1)
CHLORIDE SERPL-SCNC: 108 MMOL/L (ref 98–108)
CO2 SERPL-SCNC: 29 MMOL/L (ref 21–32)
CREAT SERPL-MCNC: 0.69 MG/DL (ref 0.6–1.2)
DEPRECATED RDW RBC AUTO: 47.8 FL (ref 35.1–46.3)
EOSINOPHIL # BLD AUTO: 0.07 K/MM3 (ref 0–0.68)
EOSINOPHIL NFR BLD AUTO: 1 % (ref 0–6)
ERYTHROCYTE [DISTWIDTH] IN BLOOD BY AUTOMATED COUNT: 13 % (ref 11.7–14.2)
GLUCOSE SERPL-MCNC: 96 MG/DL (ref 70–99)
HCT VFR BLD AUTO: 45 % (ref 37–53)
HGB BLD-MCNC: 14.9 G/DL (ref 13.5–17.5)
IMM GRANULOCYTES # BLD AUTO: 0.03 K/MM3 (ref 0–0.1)
IMM GRANULOCYTES NFR BLD AUTO: 0 % (ref 0–1)
LYMPHOCYTES # BLD AUTO: 1.33 K/MM3 (ref 0.84–5.2)
LYMPHOCYTES NFR BLD AUTO: 11 % (ref 21–46)
MCHC RBC AUTO-ENTMCNC: 33.1 G/DL (ref 31.5–36.5)
MCV RBC AUTO: 100 FL (ref 80–100)
MONOCYTES # BLD AUTO: 1.41 K/MM3 (ref 0.16–1.47)
MONOCYTES NFR BLD AUTO: 11 % (ref 4–13)
NEUTROPHILS # BLD AUTO: 9.57 K/MM3 (ref 1.96–9.15)
NEUTROPHILS NFR BLD AUTO: 77 % (ref 41–73)
NRBC # BLD AUTO: 0 K/MM3 (ref 0–0.02)
NRBC BLD AUTO-RTO: 0 /100 WBC (ref 0–0.2)
PLATELET # BLD AUTO: 154 K/MM3 (ref 150–400)
POTASSIUM SERPL-SCNC: 4.1 MMOL/L (ref 3.5–5.5)
SODIUM SERPL-SCNC: 140 MMOL/L (ref 136–145)
SP GR SPEC: 1.01 (ref 1–1.02)
UROBILINOGEN UR STRIP-MCNC: (no result) MG/DL

## 2022-04-26 NOTE — NUR
PT NOT COOPERATIVE WITH AFTERNOON VITALS. DR PRUITT NOTIFIED AND VITAL SIGNS
CHANGED TO ONCE DAILY IN THE MORNING.

## 2022-04-26 NOTE — NUR
SHIFT SUMMARY:
 
PT HAS BECOME MORE AGGRESSIVE AND DIFFICULT TO REDIRECT AND REQUIRED A POSEY
VEST, DUE TO HIS AGITATION AND UNSTEADY GAIT. THE RN ADMINISTERED PRN ZYPREXA
FOR AGITATION, WHICH WAS TEMPORARILY AFFECTIVE, BUT HIS AGITATION TURNED TO A
VERY COMBATIVE BEHAVIOR. THE PT WAS PUT INTO 4 POINT SOFT RESTRAINTS. HE HAS
BEEN RESTING, BUT FREQUENTLY TRIES TO REMOVE HIMSELF FROM THE BED. WE CONTINUE
TO CLOSELY MONITOR HIM EVERY TWO HOURS CHECKING CIRCULATION, ROM, SKIN
INTEGRITY, AND REPOSITION HIM. BED IS IN THE LOWEST POSITION AND WE'LL
CONTINUE TO MONITOR.

## 2022-04-26 NOTE — NUR
SHIFT SUMMARY
PT ALERT ONLY TO SELF. EXTREMELY IRRITABLE AND AGGITATED WITH ANY
INTERACTION. TRANSITIONED TO TAT LOCKED RESTRAINTS EXTREMITIES X4 AT 10AM. PT
VS NOT ABLE TO BE COLLECTED THIS AFTERNOON, DR PRUITT AWARE. DR PRUITT NOTIFIED
ABOUT PT AGGITATION AT 1730, B52 ORDERED AND ADMINISTERED AT 1815 WITH
SECURITY PRESENT TO HELP PROTECT NURSES. PT REFUSED EACH MEAL THIS SHIFT AND
HAS HAD NO INTAKE OF WATER OR OTHER FLUIDS. GOMEZ PLACED AS WELL R/T PT
AGGITATION AND NOT ABLE TO VOID IN URINAL. INCONTINENT WITH PVR OF OVER 400ML.
BED IN LOW POSITION, CALL LIGHT WITHIN REACH ON REMOTE VIDEO MONITORING

## 2022-04-27 LAB
ALBUMIN SERPL BCP-MCNC: 3.1 G/DL (ref 3.4–5)
ANION GAP SERPL CALCULATED.4IONS-SCNC: 4 MMOL/L (ref 6–16)
BASOPHILS # BLD AUTO: 0.06 K/MM3 (ref 0–0.23)
BASOPHILS NFR BLD AUTO: 0 % (ref 0–2)
BUN SERPL-MCNC: 17 MG/DL (ref 8–24)
CALCIUM SERPL-MCNC: 8.8 MG/DL (ref 8.5–10.1)
CHLORIDE SERPL-SCNC: 106 MMOL/L (ref 98–108)
CO2 SERPL-SCNC: 29 MMOL/L (ref 21–32)
CREAT SERPL-MCNC: 0.87 MG/DL (ref 0.6–1.2)
DEPRECATED RDW RBC AUTO: 48.6 FL (ref 35.1–46.3)
EOSINOPHIL # BLD AUTO: 0 K/MM3 (ref 0–0.68)
EOSINOPHIL NFR BLD AUTO: 0 % (ref 0–6)
ERYTHROCYTE [DISTWIDTH] IN BLOOD BY AUTOMATED COUNT: 13.2 % (ref 11.7–14.2)
GLUCOSE SERPL-MCNC: 142 MG/DL (ref 70–99)
HCT VFR BLD AUTO: 48.5 % (ref 37–53)
HGB BLD-MCNC: 16.2 G/DL (ref 13.5–17.5)
IMM GRANULOCYTES # BLD AUTO: 0.08 K/MM3 (ref 0–0.1)
IMM GRANULOCYTES NFR BLD AUTO: 0 % (ref 0–1)
LYMPHOCYTES # BLD AUTO: 0.98 K/MM3 (ref 0.84–5.2)
LYMPHOCYTES NFR BLD AUTO: 5 % (ref 21–46)
MCHC RBC AUTO-ENTMCNC: 33.4 G/DL (ref 31.5–36.5)
MCV RBC AUTO: 100 FL (ref 80–100)
MONOCYTES # BLD AUTO: 1.88 K/MM3 (ref 0.16–1.47)
MONOCYTES NFR BLD AUTO: 9 % (ref 4–13)
NEUTROPHILS # BLD AUTO: 17.52 K/MM3 (ref 1.96–9.15)
NEUTROPHILS NFR BLD AUTO: 85 % (ref 41–73)
NRBC # BLD AUTO: 0 K/MM3 (ref 0–0.02)
NRBC BLD AUTO-RTO: 0 /100 WBC (ref 0–0.2)
PHOSPHATE SERPL-MCNC: 2 MG/DL (ref 2.5–4.9)
PLATELET # BLD AUTO: 151 K/MM3 (ref 150–400)
POTASSIUM SERPL-SCNC: 4.4 MMOL/L (ref 3.5–5.5)
SODIUM SERPL-SCNC: 139 MMOL/L (ref 136–145)

## 2022-04-27 NOTE — NUR
SHIFT SUMMARY
81 YR M ADMITTED ON 3/30/22. FULL CODE. NO ACUTE CHANGES THIS SHIFT. PT HAS
SLEPT FOR THE ENTIRE SHIFT. THIS NURSE HAS HAD NO VERBAL CONTACT W/ PT THIS
SHIFT. HE IS IN 4 POINT RESTRAINTS AND RESTRAINT ASSESSMENTS HAVE BEEN
COMPLETED.

## 2022-04-27 NOTE — NUR
PT IS SLEEPING AT THIS TIME GETS AGITATED WITH STIMULATION. PT IS IN LOCKED
WRIST AND ANKLE RESTRAINTS AT THIS TIME. THE PT WAS GIVEN ZYPREXA IM THIS
AFTERNOON WHEN AGITATED AND HAS FELL ASLEEP. PT APPEARS TO BE BREATHING EASILY
ON RA. BED ALARM ON BED IN LOW POSTION. WILL CONTINUE TO MONITOR AND ASSESS
FOR CHANGES

## 2022-04-27 NOTE — NUR
PT WOKE UP AT 1400 SAT UP AT THE SIDE OF THE BED. STAFF ASSISTED TO HELP THE
PT AND HE IMMEDIATLY BECAME AGITATED AND SWINGING AND KICKING AT THE STAFF.
THE PT WAS ASSISTED BACK DOWN RESTRAINTS WERE APPLIED AND ZYPREXA WAS GIVEN IM

## 2022-04-28 LAB
ALBUMIN SERPL BCP-MCNC: 3 G/DL (ref 3.4–5)
ANION GAP SERPL CALCULATED.4IONS-SCNC: 6 MMOL/L (ref 6–16)
BASOPHILS # BLD AUTO: 0.05 K/MM3 (ref 0–0.23)
BASOPHILS NFR BLD AUTO: 0 % (ref 0–2)
BUN SERPL-MCNC: 19 MG/DL (ref 8–24)
CALCIUM SERPL-MCNC: 8.8 MG/DL (ref 8.5–10.1)
CHLORIDE SERPL-SCNC: 107 MMOL/L (ref 98–108)
CO2 SERPL-SCNC: 27 MMOL/L (ref 21–32)
CREAT SERPL-MCNC: 0.82 MG/DL (ref 0.6–1.2)
DEPRECATED RDW RBC AUTO: 48.2 FL (ref 35.1–46.3)
EOSINOPHIL # BLD AUTO: 0.07 K/MM3 (ref 0–0.68)
EOSINOPHIL NFR BLD AUTO: 0 % (ref 0–6)
ERYTHROCYTE [DISTWIDTH] IN BLOOD BY AUTOMATED COUNT: 13.2 % (ref 11.7–14.2)
GLUCOSE SERPL-MCNC: 134 MG/DL (ref 70–99)
HCT VFR BLD AUTO: 48 % (ref 37–53)
HGB BLD-MCNC: 16 G/DL (ref 13.5–17.5)
IMM GRANULOCYTES # BLD AUTO: 0.14 K/MM3 (ref 0–0.1)
IMM GRANULOCYTES NFR BLD AUTO: 1 % (ref 0–1)
LYMPHOCYTES # BLD AUTO: 1.99 K/MM3 (ref 0.84–5.2)
LYMPHOCYTES NFR BLD AUTO: 9 % (ref 21–46)
MCHC RBC AUTO-ENTMCNC: 33.3 G/DL (ref 31.5–36.5)
MCV RBC AUTO: 98 FL (ref 80–100)
MONOCYTES # BLD AUTO: 2.14 K/MM3 (ref 0.16–1.47)
MONOCYTES NFR BLD AUTO: 10 % (ref 4–13)
NEUTROPHILS # BLD AUTO: 17.91 K/MM3 (ref 1.96–9.15)
NEUTROPHILS NFR BLD AUTO: 80 % (ref 41–73)
NRBC # BLD AUTO: 0 K/MM3 (ref 0–0.02)
NRBC BLD AUTO-RTO: 0 /100 WBC (ref 0–0.2)
PHOSPHATE SERPL-MCNC: 1.9 MG/DL (ref 2.5–4.9)
PLATELET # BLD AUTO: 145 K/MM3 (ref 150–400)
POTASSIUM SERPL-SCNC: 3.9 MMOL/L (ref 3.5–5.5)
SODIUM SERPL-SCNC: 140 MMOL/L (ref 136–145)

## 2022-04-28 NOTE — NUR
SHIFT SUMMARY
DR FREY CALLED IN AM ABOUT 101.5 TEMP BY PRIMARY RN. ORDERS RECEIVED FOR WI
TYLENOL, LABS, AND CHEST XRAY. WBC HIGH AT 22.30. IV ANTIBIOTICS ORDERED. IV
STARTED TO GIVE MEDICATIONS AND FLUIDS. PT A/O X 1 TO SELF ONLY. AGITATED AND
COMBATIVE WITH CARE. TUFF CUFFS REMOVED AND BILAT SOFT WRIST RESTRAINTS
APPLIED THIS AM. RADHA VEST ON. PT REQUIRED ONE DOSE OF ZYPREXA AND ONE OF
ATIVAN THIS AFTERNOON.

## 2022-04-28 NOTE — NUR
SHIFT SUMMARY
81 YR M ADMITTED ON 3/30/22. FULL CODE. NO ACUTE CHANGES THIS SHIFT. PT HAS
SLEPT FOR THE MAJORITY OF THIS SHIFT ONLY WAKNG UP BRIEFLY A FEW TIMES. DURING
THOSE BRIEF MOMENTS HE WOULD PULL AGRESSIVELY ON HIS RESTRAINTS.

## 2022-04-28 NOTE — NUR
AM SHIFT ASSESSMENT
I AGREE WITH AND WAS PRESENT DURING THE SN DIGGS'S AM SHIFT ASSESSMENT AND
REVIEWED AND AGREE WITH HER DOCUMENTATION ON THE PATIENT

## 2022-04-29 LAB
ALBUMIN SERPL BCP-MCNC: 2.9 G/DL (ref 3.4–5)
ANION GAP SERPL CALCULATED.4IONS-SCNC: 4 MMOL/L (ref 6–16)
BASOPHILS # BLD AUTO: 0.04 K/MM3 (ref 0–0.23)
BASOPHILS NFR BLD AUTO: 0 % (ref 0–2)
BUN SERPL-MCNC: 21 MG/DL (ref 8–24)
CALCIUM SERPL-MCNC: 9 MG/DL (ref 8.5–10.1)
CHLORIDE SERPL-SCNC: 105 MMOL/L (ref 98–108)
CO2 SERPL-SCNC: 32 MMOL/L (ref 21–32)
CREAT SERPL-MCNC: 0.78 MG/DL (ref 0.6–1.2)
DEPRECATED RDW RBC AUTO: 49.4 FL (ref 35.1–46.3)
EOSINOPHIL # BLD AUTO: 0.01 K/MM3 (ref 0–0.68)
EOSINOPHIL NFR BLD AUTO: 0 % (ref 0–6)
ERYTHROCYTE [DISTWIDTH] IN BLOOD BY AUTOMATED COUNT: 13.2 % (ref 11.7–14.2)
GLUCOSE SERPL-MCNC: 109 MG/DL (ref 70–99)
HCT VFR BLD AUTO: 49.2 % (ref 37–53)
HGB BLD-MCNC: 16.2 G/DL (ref 13.5–17.5)
IMM GRANULOCYTES # BLD AUTO: 0.05 K/MM3 (ref 0–0.1)
IMM GRANULOCYTES NFR BLD AUTO: 0 % (ref 0–1)
LYMPHOCYTES # BLD AUTO: 1.38 K/MM3 (ref 0.84–5.2)
LYMPHOCYTES NFR BLD AUTO: 9 % (ref 21–46)
MCHC RBC AUTO-ENTMCNC: 32.9 G/DL (ref 31.5–36.5)
MCV RBC AUTO: 101 FL (ref 80–100)
MONOCYTES # BLD AUTO: 1.64 K/MM3 (ref 0.16–1.47)
MONOCYTES NFR BLD AUTO: 10 % (ref 4–13)
NEUTROPHILS # BLD AUTO: 12.6 K/MM3 (ref 1.96–9.15)
NEUTROPHILS NFR BLD AUTO: 80 % (ref 41–73)
NRBC # BLD AUTO: 0 K/MM3 (ref 0–0.02)
NRBC BLD AUTO-RTO: 0 /100 WBC (ref 0–0.2)
PHOSPHATE SERPL-MCNC: 1.9 MG/DL (ref 2.5–4.9)
PLATELET # BLD AUTO: 128 K/MM3 (ref 150–400)
POTASSIUM SERPL-SCNC: 4.2 MMOL/L (ref 3.5–5.5)
SODIUM SERPL-SCNC: 141 MMOL/L (ref 136–145)

## 2022-04-29 NOTE — NUR
ASSUMED CARE OF PT-
REPORT RECIEVED FROM NIGHT RN. PT IN BED WITH POSEY VEST ON FOR PT SAFETY,
SOFT WRIST RESTRAINTS IN PLACE, PT PULLING ON THE RESTRAINTS FIDGETING WITH
HIS HANDS, WRIST RESTRAINTS IN PLACE TO PROTECT LINES AND TUBES. GOMEZ IN
PLACE PATENT AND DRAINING. SPOKE TO CHARGE RN, PT JERKS WHEN STAFF SPEAK TO
HIM, BUT DOES NOT OPEN HIS EYES. PT HAS NOTED SONOROUS RESPIRATIONS WITH A
SLIGHT GURGLE. STARTED ON IV ABX YESTERDAY. WILL SPEAK TO DR ON MORNING ROUNDS
PT IS NOT ON PALLIATIVE CARE SERVICE. FAMILY JUST ARRIVED TO ASK FOR UPDATES.

## 2022-04-29 NOTE — NUR
PT WENT DOWN FOR THE CT SCAN-
PT WAS RESPONDING WELL TO THIS RN'S VOICE. PT WENT DOWN TO CT WITH THE COMPANY
OF THIS RN TO HELP KEEP HIM STILL IN THE CT MACHINE. CT TECH STATED THE PT
HELD STILL JUST LONG ENOUGH, PT STILL GRABBING AT LINES AND TUBES AND PULLING
ON RESTRAINTS SOME. ONCE RETURNED TO THE ROOM PT WAS CHANGED AND REPOSITIONED
TO LEFT VANE LYING, RIGHT WRIST WAS LEFT OUT OF RESTRAINT AT THIS TIME WITH
CLOSE MONITORING FOR LINE AND TUBE SAFETY, PROVIDED THE PT WITH AN ACTIVITY
APRON TO HELP WITH THE BUSY HANDS. WILL CTM. PT FACE WASHED AND HE HAS STARTED
TO OPEN HIS EYES A LITTLE.

## 2022-04-29 NOTE — NUR
SHIFT SUMMARY-
PT SEEMS TO BE A LITTLE MORE ALERT THIS EVENING WHEN COMPARED TO THIS MORNING,
CODE STATUS CHANGED TO LIMITED BY PALLIATIVE CARE AFTER CONVERSATION WITH THE
FAMILY AND MD. PT IS ALERT TO SELF AT THIS TIME, AND HE RESPONDS TO VERBAL
STIMULI. PT IS OFTEN WISPERING OR TALKING WITH NOONE IN THE ROOM, POSSIBLE
AUDITORY AND VISUAL HALLUCINATIONS. PT HAS NO S&S OF DISTRESS AT THIS TIME, HE
STILL HAS INTERMITTENT SONOROUS BREATHING, CALLED RT TO EVALUATE NO CONCERN AT
THIS TIME. WILL CTM. PT STILL IN RESTRAINT AT THIS TIME, TRIAL TO REMOVE THEM
FAILED TODAY. WILL REATTEMPT TOMORROW ONCE THE PT COGNITION HAS IMPROVED
FURTHER. PT GOMEZ IN PLACE PATENT AND DRAINING DARK YELLOW URINE. IVF STILL
RUNNING IN THE RIGHT HAND IV. PT REMAINS ON ROOM AIR.

## 2022-04-29 NOTE — NUR
CALLED DR FREY-
CT SCAN ORDERED, PT WAKING UP A LITTLE, STILL GRABBING AND PULLING AT
EVERYTHING. RESTRAINT STILL NEEDED AT THIS TIME TO PROTECT LINES AND TUBES;
HOWEVER PT IS TOO FIDGETY TO BE ABLE TO SUCCESSFULLY COMPLETE A CT SCAN. SPOKE
TO DR FREY ABOUT PREMEDICATING.  WANTS TO LIMIT BENZODIAZIPINES AND
ANTIPSYCHOTIC MEDS FOR THIS PT TO SEE IF MENTATION IMPROVES.  WOULD LIKE
STAFF TO ATTEMPT THE CT SCAN.

## 2022-04-29 NOTE — NUR
PT WAS PULLING AT HIS CATHETER AND TRYING TO GET HIS IV REMOVED. REPLACED THE
RIGHT WRIST RESTRAINT TO PROTECT LINES AND TUBES. PT STILL VERY CONFUSED, BOTH
EYES ARE OPEN OFF AND ON NOW. WILL CTM.

## 2022-04-29 NOTE — NUR
SHIFT SUMMARY
81 YR M ADMITTED ON 3/30/22. FULL CODE. NO ACUTE CHANGED THIS SHIFT. PT HAS
SLEPT FOR THE ENTIRETY OF THIS SHIFT. HIS COUGH IS SOUNDING VERY WET AND
GURGLY SO THIS NURSE SET UP SUCTION IN THE ROOM. WHEN SUCTION WAS ATTEMPTED,
PT BECAME AGITATED AND BIT DOWN ON THE SUCTION TUBE. A VERY SMALL AMOUNT OF
SPUTUM WAS EXPRESSED AND IT HAD A RUST COLORED TINGE TO IT. PT IS CURRENTLY IN
A POSEY VEST AND SOFT WRIST RESTRAINTS. RESTRAINT ASSESSMENTS ARE UP TO DATE.
BED IN LOW POSITION AND CALL LIGHT WITHIN REACH.

## 2022-04-29 NOTE — NUR
Received call from Primary RN Lauren, discussed case and concerns.
 
Pt resting in bed with his eyes closed. Pt in POSEY and soft restraints. Pt
does not wake to verbal or tactile stimulus.
 
Spoke with Dr Kelly and discussed case.
 
Called and spoke with Pt's daughter Savannah. Provided update and reviewed plan
of care. Offered therapeutic listening and answered questions. Discussed Pt's
code status. Educated on life sustaining measures including risk factors and
implications of CPR and Intubation. Savannah reports Pt's wishes are for
defibrialation and medication only. Pt does not want CPR or Intubation.
Continued therapeutic listening. Gentle education of disease process including
trajectory. Savannah expresses appreciation and reports no other concerns at
this time.
 
Placed order for Limited Code status per V/O from Dr Kelly.
 
Palliative Care will remain available.

## 2022-04-30 LAB
ALBUMIN SERPL BCP-MCNC: 2.6 G/DL (ref 3.4–5)
ANION GAP SERPL CALCULATED.4IONS-SCNC: 6 MMOL/L (ref 6–16)
BASOPHILS # BLD AUTO: 0.03 K/MM3 (ref 0–0.23)
BASOPHILS NFR BLD AUTO: 0 % (ref 0–2)
BUN SERPL-MCNC: 17 MG/DL (ref 8–24)
CALCIUM SERPL-MCNC: 8.3 MG/DL (ref 8.5–10.1)
CHLORIDE SERPL-SCNC: 109 MMOL/L (ref 98–108)
CO2 SERPL-SCNC: 28 MMOL/L (ref 21–32)
CREAT SERPL-MCNC: 0.73 MG/DL (ref 0.6–1.2)
DEPRECATED RDW RBC AUTO: 46.5 FL (ref 35.1–46.3)
EOSINOPHIL # BLD AUTO: 0.06 K/MM3 (ref 0–0.68)
EOSINOPHIL NFR BLD AUTO: 1 % (ref 0–6)
ERYTHROCYTE [DISTWIDTH] IN BLOOD BY AUTOMATED COUNT: 13 % (ref 11.7–14.2)
GLUCOSE SERPL-MCNC: 103 MG/DL (ref 70–99)
HCT VFR BLD AUTO: 41.4 % (ref 37–53)
HGB BLD-MCNC: 13.8 G/DL (ref 13.5–17.5)
IMM GRANULOCYTES # BLD AUTO: 0.03 K/MM3 (ref 0–0.1)
IMM GRANULOCYTES NFR BLD AUTO: 0 % (ref 0–1)
LYMPHOCYTES # BLD AUTO: 1.67 K/MM3 (ref 0.84–5.2)
LYMPHOCYTES NFR BLD AUTO: 16 % (ref 21–46)
MCHC RBC AUTO-ENTMCNC: 33.3 G/DL (ref 31.5–36.5)
MCV RBC AUTO: 98 FL (ref 80–100)
MONOCYTES # BLD AUTO: 1.41 K/MM3 (ref 0.16–1.47)
MONOCYTES NFR BLD AUTO: 14 % (ref 4–13)
NEUTROPHILS # BLD AUTO: 7.26 K/MM3 (ref 1.96–9.15)
NEUTROPHILS NFR BLD AUTO: 69 % (ref 41–73)
NRBC # BLD AUTO: 0 K/MM3 (ref 0–0.02)
NRBC BLD AUTO-RTO: 0 /100 WBC (ref 0–0.2)
PHOSPHATE SERPL-MCNC: 1.9 MG/DL (ref 2.5–4.9)
PLATELET # BLD AUTO: 138 K/MM3 (ref 150–400)
POTASSIUM SERPL-SCNC: 3.5 MMOL/L (ref 3.5–5.5)
SODIUM SERPL-SCNC: 143 MMOL/L (ref 136–145)

## 2022-04-30 NOTE — NUR
SUMMARY
PT SLEPT OFF AND ON T/O THE NIGHT. PT WAS AGITATED AT TIMES, PULLING AGAINST
RESTRAINTS, AND ATTEMPTING TO GET OOB. PT DID NOT TAKE ANYTHING PO THIS SHIFT.
PT CURRENTLY SLEEPING, IN NO DISTRESS. CALL LIGHT IN REACH.

## 2022-04-30 NOTE — NUR
SPOKE TO DR FREY ON MORNING ROUNDS-
OK TO RENEW RESTRAINTS, PT STARTING TO BECOME MORE ALERT, STILL HAVING
AUDITORY AND VISUAL HALLUCINATIONS, PICKING AT THE AIR AND CONSTANT HAND
MOTION. PT PULLS AT LINES AND TUBES THE MOMENT RESTRAINTS ARE REMOVED.

## 2022-04-30 NOTE — NUR
PT CHANGED BY STAFF-
THREE STAFF MEMBERS REQUIRED. PT HAD A BM AND NEEDED TO BE PROPERLY CLEANED.
PT ATTEMPTED TO KICK AND PUNCH STAFF WHILE THEY WERE ATTEMPTING TO CHANGE HIS
ATTENDS AND CLEAN HIM. AFTER CLEANING THE PT WAS PLACED BACK IN BILATERAL
UPPER EXTREMITY SOFT WRIST RESTRAINTS AND A POSEY VEST WAS RETIED FOR PT AND
STAFF SAFETY AS WELL AS TO PROTECT LINES AND TUBES. PT IS CONFUSED AND
EXPERIENCING VISUAL AND AUDITORY HALLUCINATIONS, HIS HANDS ARE CONSTANTLY
MOVING AND PICKING AT EVERYTHING.  IS AWARE RESTRAINT ORDER RENEWED THIS
MORNING AT 1000.

## 2022-05-01 LAB
ALBUMIN SERPL BCP-MCNC: 2.6 G/DL (ref 3.4–5)
ANION GAP SERPL CALCULATED.4IONS-SCNC: 5 MMOL/L (ref 6–16)
BASOPHILS # BLD AUTO: 0.04 K/MM3 (ref 0–0.23)
BASOPHILS NFR BLD AUTO: 0 % (ref 0–2)
BUN SERPL-MCNC: 17 MG/DL (ref 8–24)
CALCIUM SERPL-MCNC: 8.3 MG/DL (ref 8.5–10.1)
CHLORIDE SERPL-SCNC: 109 MMOL/L (ref 98–108)
CO2 SERPL-SCNC: 30 MMOL/L (ref 21–32)
CREAT SERPL-MCNC: 0.74 MG/DL (ref 0.6–1.2)
DEPRECATED RDW RBC AUTO: 48.3 FL (ref 35.1–46.3)
EOSINOPHIL # BLD AUTO: 0.1 K/MM3 (ref 0–0.68)
EOSINOPHIL NFR BLD AUTO: 1 % (ref 0–6)
ERYTHROCYTE [DISTWIDTH] IN BLOOD BY AUTOMATED COUNT: 12.9 % (ref 11.7–14.2)
GLUCOSE SERPL-MCNC: 103 MG/DL (ref 70–99)
HCT VFR BLD AUTO: 42.7 % (ref 37–53)
HGB BLD-MCNC: 14 G/DL (ref 13.5–17.5)
IMM GRANULOCYTES # BLD AUTO: 0.02 K/MM3 (ref 0–0.1)
IMM GRANULOCYTES NFR BLD AUTO: 0 % (ref 0–1)
LYMPHOCYTES # BLD AUTO: 1.61 K/MM3 (ref 0.84–5.2)
LYMPHOCYTES NFR BLD AUTO: 16 % (ref 21–46)
MCHC RBC AUTO-ENTMCNC: 32.8 G/DL (ref 31.5–36.5)
MCV RBC AUTO: 101 FL (ref 80–100)
MONOCYTES # BLD AUTO: 1.41 K/MM3 (ref 0.16–1.47)
MONOCYTES NFR BLD AUTO: 14 % (ref 4–13)
NEUTROPHILS # BLD AUTO: 6.68 K/MM3 (ref 1.96–9.15)
NEUTROPHILS NFR BLD AUTO: 68 % (ref 41–73)
NRBC # BLD AUTO: 0 K/MM3 (ref 0–0.02)
NRBC BLD AUTO-RTO: 0 /100 WBC (ref 0–0.2)
PH BLDV: 7.47 [PH] (ref 7.34–7.37)
PHOSPHATE SERPL-MCNC: 2 MG/DL (ref 2.5–4.9)
PLATELET # BLD AUTO: 146 K/MM3 (ref 150–400)
POTASSIUM SERPL-SCNC: 4 MMOL/L (ref 3.5–5.5)
SODIUM SERPL-SCNC: 144 MMOL/L (ref 136–145)

## 2022-05-01 NOTE — NUR
STATUS CHANGE-
PT HAD A NOTICABLE CHANGE IN BREATHING, WHERE HIS RESP RATE INCREASED AND HE
BEGAN HAVING APNEIC PERIODS. CALLED DR RFEY. DR MCCARTY RECIEVED ORDER FOR O2
AND CONTINUIOUS BIOX AS WELL AS VBG. CALLED LAB AND TECH CAME TO DO THE DRAW
WITH STAFF ASSISTANCE. PT SATS WERE NOTED TO BE DROPPING TO 82% ON ROOM AIR.
DURING THE APNEIC PERIODS. PT PLACED ON 2L O2 VIA NC AND SATS IMPROVED, NOW
THE LOWS ARE AT 88% ON BIOX. RT NOTIFIED AND CAME TO SEE THE PT HE WILL REVIEW
THE BLOOD GAS. IF THE NUMBERS ARE GOOD HE CAN STAY ON MED FLOOR PER RT IF NOT
HE WILL NEED TO BE TRANSFERED TO PCU OR ICU FOR CPAP BECAUSE OF THE NEED FOR
RESTRAINTS.

## 2022-05-01 NOTE — NUR
SHIFT SUMMARY-
PT ALERT TO SELF. HE STARTLES AWAKE WITH ANY STIMULUS THE PT JERKS AND BECOMES
AGITATED. PT IS COMBATIVE WITH CARE AT THIS TIME. SCROTAL US COMPLETED TODAY
PRELIMINARY RESULT SHOWS FLUID COLLECTION IN THE LEFT SCROTUM, DR MCCARTY,
AWAITING THE OFFICIAL READING. PT WAS COOPERATIVE WITH THE US. PT HAD A FULL
BED BATH TODAY WICH HE TOLLERATED MODERATLY WELL. US AFTER THAT SO HE MAY HAVE
BEEN PRETTY TIRED. CHAU FROM PALLIATIVE CARE CAME TO SEE THE PT AT THIS RN'S
REQUEST. PT IS IRRITABLE AND DIFFICULT TO CHANGE AND CONSTANTLY STIFF, NO
MEDICAL Hx AVAILABLE; [facility]. PT IS
CURRENTLY IN BED, CALL LIGHT IN REACH, CLINIMIX INFUSING,HE SEEMS TO BE
RESTING FOR THE FIRST TIME TODAY. WILL CTM AND PASS ON TO NIGHT RN.

## 2022-05-01 NOTE — NUR
Review of pt [facility] records [facility] and [facility].
Called pt daughter to get some history. She states pt has history of ptsd from
the war and long history of drinking. states he has a history of arythmias, he
had a hernia last year and history of kidney stones.
  She states severe social issues of homeless and substance abuse and ptsd.
pt grimacing anggitated in restraints. Daughter states if touched while
sleeping he would go "bezerk".
  Will review with physician and follow up on ultrasound results ast best plan
of care. will suggest DNR.

## 2022-05-01 NOTE — NUR
review of pt history and change with hospitalist and charge nurse. requested
hist[facility] and physicl from [facility]. Pt may be declining and  need transition to
hospice care. Team meeting with night staff on physicians tonight on pt safety
and protection so we can preserve his Iv and snyder and provide him care.

## 2022-05-01 NOTE — NUR
SHIFT SUMMARY:
 
PT REMAINED IN SOFT WRIST AND A POSEY VEST THIS SHIFT. GOMEZ IS PATENT W/ TEA
COLORED URINE. PT DID HAVE ONE BM AND WASN'T VERY COMBATIVE WHEN CHANGING.
LEFT ARM/ELBOW DRESSING ARE CDI. PT'S EYES WERE CLEANED WELL WITH A WARM
WASHCLOTH. HE WAS NOT COOPERATIVE WITH ORAL CARE. THE PT WAS REPOSITIONED Q2,
THE RN AND CNA WILL CONTINUE TO MONITOR FREQUENTLY.

## 2022-05-01 NOTE — NUR
SPOKE TO DR FREY ABOUT RESTRAINT RENEWAL, TO PROTECT LINES AND TUBES AS WELL
AS STAFF FROM PT WHO IS CONFUSED AND COMBATIVE WITH CARE AT THIS TIME.
RESTRAINTS RENEWED. ALSO SPOKE TO HER ABOUT THE PT ENLARGED LEFT TESTICLE. SHE
VISUALIZED IT ON MORNING ROUNDS AND WILL ORDER AN ULTRASOUND.

## 2022-05-01 NOTE — NUR
PT ROLLED AND CHANGED INTO A NEW ATTEND-
SMALL BM NOTED. PT COMBATIVE WITH STAFF WHEN THEY PERFORM PERSONAL CARE AND
WHEN THEY ATTEMPT TO ROLL HIM. CURRENTLY THE PT HAS NOT BEEN MEDICATED WITH
ANY BENZODIAZIPINES OF ANTIPSYCHOTYIC MEDS, IN AN ATTEMPT TO ALLOW HIM TO
CLEAR COGNITIVELY. WHEN STAFF ARE NOT ATTEMPTING TO MOVE THE PT OR PERFORMING
PERSONAL CARE HE LAYS IN BED AND FIDGETS CONSTANTLY TALKING TO AN EMPTY ROOM
IN SLURRED, GARBLED, NONSENSICAL SPEECH AND PICKING AT EVERYTHING. PT AT TIMES
RESPONDS TO STAFF APPROPRIATELY IN MUMBLED SPEECH. PT IS UNABLE TO EAT
CURRENTLY DR ORDERED CLINIMIX, INFUSING CURRENTLY.

## 2022-05-01 NOTE — NUR
BED BATH-
PT RECIEVED A FULL BED BATH WITH ATTENDS CHANGE AND LINNEN CHANGE, RADHA VEST
CHANGED AS WELL. THREE STAFF USED FOR PT AND STAFF SAFETY AS PT IS COMBATIVE
WITH PERSONAL CARE. ORAL CARE NOT ABLE TO BE PROVIDED AT THIS TIME, PT STILL
CONFUSED. CALLED PALLIATIVE CARE TO COME SEE THE PT, AS THERE IS NO MEDICAL Hx
KNOWN AT THIS TIME. PALLIATIVE CARE IS REQUESTING [facility] FROM [facility] AS THE PT
IS A .

## 2022-05-02 LAB
ALBUMIN SERPL BCP-MCNC: 2.5 G/DL (ref 3.4–5)
ANION GAP SERPL CALCULATED.4IONS-SCNC: 6 MMOL/L (ref 6–16)
BASOPHILS # BLD AUTO: 0.03 K/MM3 (ref 0–0.23)
BASOPHILS NFR BLD AUTO: 0 % (ref 0–2)
BUN SERPL-MCNC: 19 MG/DL (ref 8–24)
CALCIUM SERPL-MCNC: 8.2 MG/DL (ref 8.5–10.1)
CHLORIDE SERPL-SCNC: 106 MMOL/L (ref 98–108)
CK SERPL-CCNC: 374 U/L (ref 39–308)
CO2 SERPL-SCNC: 28 MMOL/L (ref 21–32)
CREAT SERPL-MCNC: 0.68 MG/DL (ref 0.6–1.2)
DEPRECATED RDW RBC AUTO: 46.4 FL (ref 35.1–46.3)
EOSINOPHIL # BLD AUTO: 0.01 K/MM3 (ref 0–0.68)
EOSINOPHIL NFR BLD AUTO: 0 % (ref 0–6)
ERYTHROCYTE [DISTWIDTH] IN BLOOD BY AUTOMATED COUNT: 12.6 % (ref 11.7–14.2)
GLUCOSE SERPL-MCNC: 127 MG/DL (ref 70–99)
HCT VFR BLD AUTO: 43 % (ref 37–53)
HGB BLD-MCNC: 14.5 G/DL (ref 13.5–17.5)
IMM GRANULOCYTES # BLD AUTO: 0.06 K/MM3 (ref 0–0.1)
IMM GRANULOCYTES NFR BLD AUTO: 0 % (ref 0–1)
LYMPHOCYTES # BLD AUTO: 1.72 K/MM3 (ref 0.84–5.2)
LYMPHOCYTES NFR BLD AUTO: 12 % (ref 21–46)
MCHC RBC AUTO-ENTMCNC: 33.7 G/DL (ref 31.5–36.5)
MCV RBC AUTO: 99 FL (ref 80–100)
MONOCYTES # BLD AUTO: 1.77 K/MM3 (ref 0.16–1.47)
MONOCYTES NFR BLD AUTO: 12 % (ref 4–13)
NEUTROPHILS # BLD AUTO: 10.86 K/MM3 (ref 1.96–9.15)
NEUTROPHILS NFR BLD AUTO: 75 % (ref 41–73)
NRBC # BLD AUTO: 0 K/MM3 (ref 0–0.02)
NRBC BLD AUTO-RTO: 0 /100 WBC (ref 0–0.2)
PHOSPHATE SERPL-MCNC: 2.1 MG/DL (ref 2.5–4.9)
PLATELET # BLD AUTO: 150 K/MM3 (ref 150–400)
POTASSIUM SERPL-SCNC: 3.6 MMOL/L (ref 3.5–5.5)
SODIUM SERPL-SCNC: 140 MMOL/L (ref 136–145)

## 2022-05-02 NOTE — NUR
Pt remains more somulent with periods of aggiation. Lung sounds noted to be
more coarse. Pt remains on restraints.
  recieved [facility]gressnotes on [facility] placed on chart. Pt has
Note from 2020 presnts worsening history of dementia.
   Review of pt with [facility] clincial coordinator and [facility]
ethicist. Will suggest physician conversation with daughter ham ambrocio of
suffering to pt with worsening dementia. Hospice would be best course for the
unfortunate .

## 2022-05-02 NOTE — NUR
DAY SHIFT SUMMARY
 
81 YR OLD MALE PT WITH DEMENTIA, 2L O2 NC. PT HAS A GOMEZ CATH
INTACT/PATENT/DRAINING VIA GRAVITY. URINE IS TEA COLORED. LT TESTICLE IS
SWOLLEN. PT WITH GARBLED SPEECH. RESTRAINTS IN PLACE AND COMBATIVE WITH CARE.
CALL LIGHT WITHIN REACH WITH FREQUENT ROUNDINGS. PT ON VIDEO REMOTE
MONITORING. Ok to

## 2022-05-02 NOTE — NUR
PT HAS BEEN EXHIBITING SHORT EPISODES OF APNEIC BREATHING WHERE HE'LL DESAT TO
88% BUT RECOVER QUICKLY TO >92% WHILE STILL ON 2L OF O2. THE CHARGE NURSE,
PALLATIVE CARE, HOSPITALIST AND [facility] RNs HAVE DISCUSSED OPTIONS AND A
DECISION WAS MADE TO KEEP THE PATIENT ON THE MEDICAL FLOOR. THE PT'S DAUGHTER
WAS NOTIFIED TO HELP MAKE A DECISION FOR WHAT DIRECTION WE SHOULD GO IF THE PT
CONTINUES TO DESAT FOR LONGER PERIODS. IT SEEMS SHE WANTS THE PT TO GO DOWN TO
ICU FOR CPAP AND KEEP HIS CODE STATUS AS IS AND NOT COMFORT CARE. CURRENTLY
THE PT IS MAINTAINING O2 SATS AT 95%. THE RN AND CNA ARE CONTINUING TO MONITOR
FREQUENTLY AND WILL DO SO THE REMAINDER OF THE SHIFT.

## 2022-05-02 NOTE — NUR
SHIFT SUMMARY:
 
THE PT REMAINS IN WRIST RESTRAINTS AND A POSEY VEST FOR SAFETY TO HIMSELF AND
STAFF. HE IS NOW ON CONT PULSE OX AND SATING >92% ON 2L OF O2. HIS EPISODES OF
APNEA HAVE NOT BEEN PRESENT SINCE THE BEGINNING OF SHIFT. ANTIPSYCHOTICS HAVE
BEEN AVOIDED THIS SHIFT. THE GOMEZ CATH IS PATENT AND HAS BLAISE & CLEAR URINE.
THE STAFF HAS BEEN DILIGENT IN MONITORING THE PT VERY FREQUENTLY FOR PT
SAFETY.

## 2022-05-03 LAB
ALBUMIN SERPL BCP-MCNC: 2.6 G/DL (ref 3.4–5)
ANION GAP SERPL CALCULATED.4IONS-SCNC: 4 MMOL/L (ref 6–16)
BASOPHILS # BLD AUTO: 0.03 K/MM3 (ref 0–0.23)
BASOPHILS NFR BLD AUTO: 0 % (ref 0–2)
BUN SERPL-MCNC: 20 MG/DL (ref 8–24)
CALCIUM SERPL-MCNC: 8.5 MG/DL (ref 8.5–10.1)
CHLORIDE SERPL-SCNC: 106 MMOL/L (ref 98–108)
CO2 SERPL-SCNC: 31 MMOL/L (ref 21–32)
CREAT SERPL-MCNC: 0.71 MG/DL (ref 0.6–1.2)
DEPRECATED RDW RBC AUTO: 46.6 FL (ref 35.1–46.3)
EOSINOPHIL # BLD AUTO: 0 K/MM3 (ref 0–0.68)
EOSINOPHIL NFR BLD AUTO: 0 % (ref 0–6)
ERYTHROCYTE [DISTWIDTH] IN BLOOD BY AUTOMATED COUNT: 13 % (ref 11.7–14.2)
GLUCOSE SERPL-MCNC: 135 MG/DL (ref 70–99)
HCT VFR BLD AUTO: 44.4 % (ref 37–53)
HGB BLD-MCNC: 14.8 G/DL (ref 13.5–17.5)
IMM GRANULOCYTES # BLD AUTO: 0.11 K/MM3 (ref 0–0.1)
IMM GRANULOCYTES NFR BLD AUTO: 1 % (ref 0–1)
LYMPHOCYTES # BLD AUTO: 1.49 K/MM3 (ref 0.84–5.2)
LYMPHOCYTES NFR BLD AUTO: 9 % (ref 21–46)
MCHC RBC AUTO-ENTMCNC: 33.3 G/DL (ref 31.5–36.5)
MCV RBC AUTO: 98 FL (ref 80–100)
MONOCYTES # BLD AUTO: 2.08 K/MM3 (ref 0.16–1.47)
MONOCYTES NFR BLD AUTO: 12 % (ref 4–13)
NEUTROPHILS # BLD AUTO: 13.76 K/MM3 (ref 1.96–9.15)
NEUTROPHILS NFR BLD AUTO: 79 % (ref 41–73)
NRBC # BLD AUTO: 0 K/MM3 (ref 0–0.02)
NRBC BLD AUTO-RTO: 0 /100 WBC (ref 0–0.2)
PHOSPHATE SERPL-MCNC: 2.5 MG/DL (ref 2.5–4.9)
PLATELET # BLD AUTO: 171 K/MM3 (ref 150–400)
POTASSIUM SERPL-SCNC: 4.4 MMOL/L (ref 3.5–5.5)
SODIUM SERPL-SCNC: 141 MMOL/L (ref 136–145)

## 2022-05-03 NOTE — NUR
SHIFT SUMMARY:
 
PT IS STILL UNCOOPERATIVE WITH CARE. CURRENTLY ON 2L OF O2. SPEECH IS GARBLED,
BUT HE SEEMS TO UNDERSTAND SOME OF OUR VERBAL CUES. WR/MITTS/RADHA ORDER WAS
RENEWED AT 2000 ON 5/2. PT DID NOT HAVE ANY APNEIC EPISODES THIS SHIFT. JASON
IS PATENT WITH BLAISE/CLEAR OUTPUT.

## 2022-05-03 NOTE — NUR
DAY SHIFT SUMMARY
 
81 YR OLD MALE WITH DEMENTIA, IN RESTRAINTS D/T BEHAVIOURAL DISTURBANCES. PT
ON 2L O2 NC. GOMEZ INTACT/PATENT, DRAINING VIA GRAVITY WITH TEA COLORED URINE.
PT ON CONTINUIOUS IV FLUIDS. FREQUENT ROUNDING. NO ACUTE CHANGES THIS SHIFT.

## 2022-05-04 LAB
ALBUMIN SERPL BCP-MCNC: 2.4 G/DL (ref 3.4–5)
ANION GAP SERPL CALCULATED.4IONS-SCNC: 4 MMOL/L (ref 6–16)
BASOPHILS # BLD AUTO: 0.04 K/MM3 (ref 0–0.23)
BASOPHILS NFR BLD AUTO: 0 % (ref 0–2)
BUN SERPL-MCNC: 24 MG/DL (ref 8–24)
CALCIUM SERPL-MCNC: 8.4 MG/DL (ref 8.5–10.1)
CHLORIDE SERPL-SCNC: 104 MMOL/L (ref 98–108)
CO2 SERPL-SCNC: 31 MMOL/L (ref 21–32)
CREAT SERPL-MCNC: 0.66 MG/DL (ref 0.6–1.2)
DEPRECATED RDW RBC AUTO: 47.8 FL (ref 35.1–46.3)
EOSINOPHIL # BLD AUTO: 0 K/MM3 (ref 0–0.68)
EOSINOPHIL NFR BLD AUTO: 0 % (ref 0–6)
ERYTHROCYTE [DISTWIDTH] IN BLOOD BY AUTOMATED COUNT: 12.9 % (ref 11.7–14.2)
GLUCOSE SERPL-MCNC: 131 MG/DL (ref 70–99)
HCT VFR BLD AUTO: 43.6 % (ref 37–53)
HGB BLD-MCNC: 14.6 G/DL (ref 13.5–17.5)
IMM GRANULOCYTES # BLD AUTO: 0.16 K/MM3 (ref 0–0.1)
IMM GRANULOCYTES NFR BLD AUTO: 1 % (ref 0–1)
LYMPHOCYTES # BLD AUTO: 1.13 K/MM3 (ref 0.84–5.2)
LYMPHOCYTES NFR BLD AUTO: 7 % (ref 21–46)
MCHC RBC AUTO-ENTMCNC: 33.5 G/DL (ref 31.5–36.5)
MCV RBC AUTO: 99 FL (ref 80–100)
MONOCYTES # BLD AUTO: 1.88 K/MM3 (ref 0.16–1.47)
MONOCYTES NFR BLD AUTO: 11 % (ref 4–13)
NEUTROPHILS # BLD AUTO: 13.5 K/MM3 (ref 1.96–9.15)
NEUTROPHILS NFR BLD AUTO: 81 % (ref 41–73)
NRBC # BLD AUTO: 0 K/MM3 (ref 0–0.02)
NRBC BLD AUTO-RTO: 0 /100 WBC (ref 0–0.2)
PHOSPHATE SERPL-MCNC: 1.8 MG/DL (ref 2.5–4.9)
PLATELET # BLD AUTO: 173 K/MM3 (ref 150–400)
POTASSIUM SERPL-SCNC: 3.9 MMOL/L (ref 3.5–5.5)
SODIUM SERPL-SCNC: 139 MMOL/L (ref 136–145)

## 2022-05-04 NOTE — NUR
Had a long conversation with pt's daughter Savannah. She voices concern
regarding her father's prognosis. She states she's seeing changes in her dad
that make her wonder if he is improving, or if he's actually declining. She
states she is continually questioning if "this is what my dad would want". She
sat with him for several hours today, and "using oxygen, artificial
feeding and antibiotics to keep him alive, but with no quality is starting to
feel wrong".
   She also talked about her brothers and sister today, stating they are
putting the decision making on her shoulders, and she would like more support
from them. She is going to ask them to be more involved, and is open to having
a phone conference early next week to discuss advanced care planning.
    Provided therapeutic listening and emotional support.
    She is planning to talk to her siblings tonight, and plan to meet here on
Monday, 5/9 around 10am to review goals of care. I will also discuss with Dr. Burns.
    Palliative care to remain available.

## 2022-05-04 NOTE — NUR
SHIFT SUMMARY:
 
PT. IS AROUSABLE W/ ANY FEELING OF TOUCH. HE INSTANTLY GETS COMBATIVE, AND
STARTS MUMBLING GARBBLED WORDS. PT. IS NOT COOPERATIVE, AND IS COMBATIVE. HE
TRIES TO PULL AT HIS IV TUBING, GOMEZ AND O2 VIA NC. PT IN RESTRAINTS
FOR LINE/TUBE PROTECTION AND PT SAFETY. THE PT. IS NPO DUE TO THE LEVEL
OF CONSIOUSNESS. HE IS ONLY ORIENTED TO SELF. PT. IS COURSE IN ALL LOBES OF
HIS LUNGS.  LOWER LOBES ARE DIMINISHED. PT. IS CURRENTLY ON 4 L NC. HE
DESTURATES W/ ACTIVITY (TURNS, BATHS, ETC). PT FAMILY VISITED TODAY AND
YVES SCHWARTZ BEGAN TO HAVE AN OPEN CONVERSATION ABOUT THEIR TAKE ON THE PT'S
GOALS. FAMILY ASKED MORE QUESTIONS, WERE RN ANSWERED.

## 2022-05-04 NOTE — NUR
SHIFT SUMMARY
INITIALLY PT SLEEPING AND FOUGHT VERY LITTLE WITH PROVIDING CARE BUT PT WOKE
AT APPROX 0100 AND WAS AWAKE UNTIL APPROX 0300. DURING THIS TIME PT WAS VERY
AGITATED. KICKING THE END OF THE BED REPEATEDLY AND AGGRESSIVELY PULLING ON
HIS RESTRAINTS. YELLING OUT AT STAFF AND ATTEMPTING TO KICK AND HIT STAFF WITH
TURNING OR REPOSITIONING. TRYING TO PULL ON HIS GOMEZ CATHETER. GOMEZ REMAINED
PATENT AND DRAINING. URINE TEA LIKE IN COLOR. PT REMAINED IN RESTRAINTS
THROUGHOUT THE NIGHT, EXCEPT WHEN REMOVED TO PROVIDE CARE. PT WOULD NOT ALLOW
ORAL CARE.  CLENCHING DOWN HIS MOUTH WHENEVER ATTEMPTED. PT'S LUNGS COARSE,
BREATHING SOUNDS LIKE HE HAS PHLEGM IN THE BACK OF HIS THROAT BUT WILL NOT
ALLOW SUCTIONING. OCCASSIONALY COUGHS. NOT PRODUCTIVE. CLINIMIX TRANSFUSING.
VITAL SIGNS STABLE.

## 2022-05-05 LAB
ALBUMIN SERPL BCP-MCNC: 2.3 G/DL (ref 3.4–5)
ANION GAP SERPL CALCULATED.4IONS-SCNC: 7 MMOL/L (ref 6–16)
BASOPHILS # BLD AUTO: 0.03 K/MM3 (ref 0–0.23)
BASOPHILS NFR BLD AUTO: 0 % (ref 0–2)
BUN SERPL-MCNC: 28 MG/DL (ref 8–24)
CALCIUM SERPL-MCNC: 8.8 MG/DL (ref 8.5–10.1)
CHLORIDE SERPL-SCNC: 105 MMOL/L (ref 98–108)
CO2 SERPL-SCNC: 32 MMOL/L (ref 21–32)
CREAT SERPL-MCNC: 0.66 MG/DL (ref 0.6–1.2)
DEPRECATED RDW RBC AUTO: 47.8 FL (ref 35.1–46.3)
EOSINOPHIL # BLD AUTO: 0 K/MM3 (ref 0–0.68)
EOSINOPHIL NFR BLD AUTO: 0 % (ref 0–6)
ERYTHROCYTE [DISTWIDTH] IN BLOOD BY AUTOMATED COUNT: 13.1 % (ref 11.7–14.2)
GLUCOSE SERPL-MCNC: 117 MG/DL (ref 70–99)
HCT VFR BLD AUTO: 43.2 % (ref 37–53)
HGB BLD-MCNC: 14.3 G/DL (ref 13.5–17.5)
IMM GRANULOCYTES # BLD AUTO: 0.09 K/MM3 (ref 0–0.1)
IMM GRANULOCYTES NFR BLD AUTO: 1 % (ref 0–1)
LYMPHOCYTES # BLD AUTO: 1.55 K/MM3 (ref 0.84–5.2)
LYMPHOCYTES NFR BLD AUTO: 11 % (ref 21–46)
MCHC RBC AUTO-ENTMCNC: 33.1 G/DL (ref 31.5–36.5)
MCV RBC AUTO: 100 FL (ref 80–100)
MONOCYTES # BLD AUTO: 1.56 K/MM3 (ref 0.16–1.47)
MONOCYTES NFR BLD AUTO: 11 % (ref 4–13)
NEUTROPHILS # BLD AUTO: 10.92 K/MM3 (ref 1.96–9.15)
NEUTROPHILS NFR BLD AUTO: 77 % (ref 41–73)
NRBC # BLD AUTO: 0 K/MM3 (ref 0–0.02)
NRBC BLD AUTO-RTO: 0 /100 WBC (ref 0–0.2)
PHOSPHATE SERPL-MCNC: 2.5 MG/DL (ref 2.5–4.9)
PLATELET # BLD AUTO: 223 K/MM3 (ref 150–400)
POTASSIUM SERPL-SCNC: 4.2 MMOL/L (ref 3.5–5.5)
SODIUM SERPL-SCNC: 144 MMOL/L (ref 136–145)

## 2022-05-05 NOTE — NUR
SHIFT SUMMARY
PT REMAINS IN WRIST, VEST, AND MITT RESTRAINTS. PT HAS BEEN VERY SLEEPY THIS
SHIFT AND OCCASIONALLY WILL FIGHT AGAINST HIS RESTRAINTS. PT GETS COMBATIVE
WHEN PATIENT CARE IS PROVIDED. CURRENTLY ON 4 LITERS O2 AND SATTING ABOVE 90%.
WHEN PT REMOVES O2 HIS SATURATIONS DROP INTO THE LOW 80'S. PT IS NOT EATING
AND DRINKING AND CLINIMIX IS RUNNING. VSS. GOMEZ PATENT AND DRAINING TO
GRAVITY.

## 2022-05-05 NOTE — NUR
patient with Continuous pulse ox on all night. O2 running low 90s when patient
calm and leaving O2 cannula in place. Patient agitated until about 0400.
Multiple bouts of kicking, thrashing, yelling throughout the night. Patient
had 2 smal BMs in the night. It took 3 staff members to safely restaint and
turn him to be cleaned up. patient removing O2 cannula as often as possible.
RFA IV patient and intact. Clinimix infusing along with Flagyl at 0000. Yadav
patent, pink tinged urine present. Restraint order renewed at 2000. Restaint
documentation completed.

## 2022-05-06 NOTE — NUR
PT WAS AGIATED AND ATTEMPTING TO GET OUT OF BED. PT HEART RATE FLUCUATES FROM
120-150 THROUGHOUT SHIFT.PT WAS ASSESSED EVERY 2 HOURS WHILE IN [employer] AND RADHA VEST. PT CYN IV INFLITARTED AND IV WAS REMOVED. PT HAS A
RLA IV PLACED AND FLUSHED. PT CONTIUNES TO RECIVIED CLINIMIX IV INFUSION
THROUGHOUT THE SHIFT. PT RECIVIED IV FLAGYL TWICE DURING THE SHIFT. PT WAS
GIVEN ORAL CARE, PT WOULD BITE DOWN ON SPONAGE AND SUCTION TUBE. PT HAS A
BUILD UP OF ORAL SECRETIONS AND NOT ALLOWING FOR ORAL CARE.PT WAS
REPOSTIONED, BREIF CHANGE AND CATH CARE. PT DNR BAND WAS PLACED ON LEFT
ANKLE. PT FAMILY TALKED WITH DOCTOR AND PALLIATIVE CARE. PT RESTING IN BED.

## 2022-05-06 NOTE — NUR
Pt ex wife in to see him very anxious about his care. We reviewed his life
for about thirty minutes. She states pt does not like to angelo any medications
and is paranoid of medical care. Review of tp mental status and aggitation.
Review of pt medical needs. She states the children are struggling to see
hime. They will come monday and struggle with making him comfort care.
  advised her that is not acceptable he is suffering and needs more care and
they need to make a decision now due to his great suffering. Gently explained
his aggitation.
  Updated staff and physician that if they cant or wont make a decision we
need an ethics consult and place him on comfort so he is not longer
restrained. will follow up.

## 2022-05-06 NOTE — NUR
NIGHT SHIFT SUMMARY
 
PT FAMILY TO HAVE DISCUSSION ON MONDAY ABOUT POSSIBLE COMFORT CARE. HE IS
RESPONDING TO VERBAL STIMULI AND TRYING TO HAVE CONVERSATIONS BUT IS CONFUSED.
HE HAS BEEN RESTRAINED IN MITTS, POSEY, AND BILATERAL SOFT WRIST RESTRAINTS
DUE TO CONSTANTLY PULLING AT GOMEZ AND TRYING TO GET OUT OF BED. PT BEGAN
ATTEMPTING TO KICK STAFF AND CLIMB OUT OF BED SO WAS PLACED IN BILATERAL ANKLE
RESTRAINTS AND FOUR BED RAILS AS WELL. ORDER OBTAINED FOR IM ZYPREXA DUE TO PT
STRUGGLING AGAINST RESTRAINTS THROUGHOUT THE SHIFT. GOMEZ IS PATENT AT THIS
TIME, DRAINING BLAISE URINE. HIS BREATHING HAS BEEN LABORED AND HOARSE,
CONTINUOUS PULSE OX IN THE HIGH 80S TO LOW 90S. PT HAS ATTEMPTED TAKING OFF
HIS 5L OF O2 BY CANNULA AND DESATS INTO THE LOW 80S. PT SHAKES HIS HEAD WHEN
STAFF ATTEMPT TO REPLACE CANNULA. IV PATENT AND INFUSING CLINIMIX.

## 2022-05-06 NOTE — NUR
SHIFT SUMMARY
PT RESTING QUIETLY IN RESTRAINTS DURING SHIFT REPORT THIS AM. PT BECOMING
AGITATED IMMEDIATELY TO VERBAL STIMULI AND THEN AGAIN WHILE ATTEMPTING ROUTINE
CARE. PER SHIFT REPORT, PT BECOMING AGITATED AND COMBATIVE, PULLING AT IV
LINES AND GOMEZ CATH. SPEECH IS DIFFICULT TO UNDERSTAND, MOSTLY GARBLED. DOES
NOT FOLLOW INSTRUCTIONS OR ANS MOST QUESTIONS. PT'S EX-WIFE HERE THIS
AFTERNOON TO SIT WITH PT FOR A WHILE. DR IZQUIERDO IN TO CK ON PT AND TALKED
WITH EX-WIFE. PALLIATIVE CARE RN ALSO HERE TO CK ON PT AND TALKED WITH
EX-WIFE WHEN DR IZQUIERDO FINISHED. FAMILY TO COME IN TOGETHER TO DISCUSS TX TO
COMFORT CARE. IV TO CYN INFILTRATED AT START OF SHIFT, NEW IV PLACED TO RFA.
CLINIMIX INFUSING PER EMAR. IV ABX ADMIN PER EMAR. GOMEZ TO GRAVITY; PATENT.
PT REPOSITIONED THRU OUT THE DAY. ATTENDS CHANGED FOR BOWEL INCONTINENCE. BED
BATH AND LINEN CHANGE DONE. RESTING QUIETLY AGAIN AT THIS TIME. CALL LT IN
REACH. BED ALARM ON FOR SAFETY.

## 2022-05-07 NOTE — NUR
SHIFT SUMMARY
PT GIVEN BED BATH THIS MORNING AND SIGNIFICANT ORAL CARE.  SKIN APPEARS INTACT
BUT DOES HAVE AN OLD SCAB ON L ELBOW.  PROTECTIVE DRESSINGS APPLIED TO ELBOWS.
FOUGHT WITH ANY CARE PROVIDED.  DID REMOVED LEG RESTRAINTS AND PT HASN'T
ATTEMPTED TO KICK AT STAFF BUT DOES USE FEET FOR MOBILITY IN BED.  SATS DROP
WHEN HE WRIGGLES O2 OUT OF HIS NOSE.  SLEEPING MOST OF THE DAY.  RESP
IRREGULAR AND CAN BE RAPID AT TIMES.  CONTINUOUS PULSE OX IN PLACE.

## 2022-05-07 NOTE — NUR
SHIFT SUMMARY
NONVERBAL. OCCASIONAL MOAN OR GARBLE WORDS TO PAINFUL STIMULI. MOSTLY
SOMNOLENT THIS SHIFT. DOES GET AGITATED c REPOSITIONING OR CARE PROVIDED.
CLENCHES FISTS, ATTEMPTS TO SWING ARMS OR KICK LEGS & TRIES TO PULL AT GOMEZ.
VSS. BP RUNNING SOFT @/56, THEREFORE HELD LASIX TO PREVENT BP FROM
DROPPING MORE. SPO2 >90% ON 5L O2. LS DIM c COARSE BASES. HAS PERIODS APNEA,
GASPING, TACHYPNEA, ACCESSORY MUSCLE USE c OCC NONPRODUCTIVE WEAK MOIST COUGH.
PT HAS MANY NONVERBAL SIGNS OF PAIN, GRIMACES, MOANS, TENSES UP BODY-NO PAIN
MEDICATION ORDERED @THIS TIME R/T PT BEING SOMNOLENT. PT NOT ABLE TO TOLERATE
PO INTAKE, ASPIRATION RISK. TURNED & REPOSITIONED Q2. RESTRAINTS IN PLACE FOR
SAFETY. BED ALARM IN REACH. WILL MONITOR.

## 2022-05-08 LAB
ANION GAP SERPL CALCULATED.4IONS-SCNC: 3 MMOL/L (ref 6–16)
BUN SERPL-MCNC: 29 MG/DL (ref 8–24)
CALCIUM SERPL-MCNC: 8.5 MG/DL (ref 8.5–10.1)
CHLORIDE SERPL-SCNC: 106 MMOL/L (ref 98–108)
CO2 SERPL-SCNC: 37 MMOL/L (ref 21–32)
CREAT SERPL-MCNC: 0.71 MG/DL (ref 0.6–1.2)
DEPRECATED RDW RBC AUTO: 49.5 FL (ref 35.1–46.3)
ERYTHROCYTE [DISTWIDTH] IN BLOOD BY AUTOMATED COUNT: 13.4 % (ref 11.7–14.2)
GLUCOSE SERPL-MCNC: 120 MG/DL (ref 70–99)
HCT VFR BLD AUTO: 46.2 % (ref 37–53)
HGB BLD-MCNC: 15 G/DL (ref 13.5–17.5)
MCHC RBC AUTO-ENTMCNC: 32.5 G/DL (ref 31.5–36.5)
MCV RBC AUTO: 101 FL (ref 80–100)
NRBC # BLD AUTO: 0 K/MM3 (ref 0–0.02)
NRBC BLD AUTO-RTO: 0 /100 WBC (ref 0–0.2)
PH BLDV: 7.5 [PH] (ref 7.34–7.37)
PLATELET # BLD AUTO: 319 K/MM3 (ref 150–400)
POTASSIUM SERPL-SCNC: 3.5 MMOL/L (ref 3.5–5.5)
SODIUM SERPL-SCNC: 146 MMOL/L (ref 136–145)

## 2022-05-08 NOTE — NUR
SHIFT SUMMARY
PT AWAKE MUCH MORE TODAY.  MUMBLING AND AT TIMES UNDERSTANDABLE.  FIGHTING
WITH ANY CARE PROVIDED.  WILL ATTEMPT TO SWING HIS FIST AT STAFF WHILE
CLEANING HIM AND USING FEET TO PROPELL HIM ABOUT IN BED.  DOES CALM WHEN LEFT
ALONE AND WILL RESPOND TO VERBAL REASSURANCE AND TOUCH BUT ONLY FOR VERY SHORT
MOMENTS IN TIME.  L ARM HAS BEEN EDEMATOUS.  L UPPER ARM IV APPEARED
INFILTRATED WITH REDNESS ABOVE SITE.  EYES OPEN WITH SEECH.  MANAGES TO PULL
O2 OFF FRQUENTLY.  O2 DROPPED TO 2L/M BY NC BY THIS TIME OF SHIFT TODAY AND
APPEARS TO BE TOLERATING ACCORDING TO VISUALIZATION AND PULSE OXIMETER.  DID
EAT A FEW BITES OF BREAKFAST AND NO S/S OF ASPIRATION NOTED.  SLEEPING AT
LUNCH.

## 2022-05-08 NOTE — NUR
SHIFT SUMMARY
PT MORE ALERT & RESPONSIVE TO VERBAL STIUMLI TONIGHT. AOX0, STILL UNABLE TO
ANSWER ORIENTATION QUESTIONS OR FOLLOW DIRECTIONS. CONFUSED. MOANS c GARBLED
MUMMBLING SPEECH WHICH IS DIFFICULT TO UNDERSTAND. IMPULSIVE, GETS AGITATED c
ALL CARE PROVIDED & TRYS TO SWING ARMS OR KICK UP LEGS. VSS. SPO2 >90% ON 5L
O2, SHALLOW, LABOURED, TACHYPNIC BREATHING c ACCESSORY MUSCLE USE. LS DIM c
COARSE BREATH SOUNDS. PT STILL HAS MANY NONVERBAL PAIN SIGNS-MOANS, GRIMACES,
STIFFENS UP BODY, DIFFICULT TO CONSOLE. REPOSITIONED TO HELP DISCOMFORT. PO
CARE PROVIDED. NEW IV PLACE BY CHARGE RN. JASON PATENT & DRAINING DARK ORANGE
URINE. CLINIMIX @75ML/HR RUNNING SINCE PT NPO & UNSAFE TO SWALLOW. BED ALARM
IN PLACE. WILL MONITOR.

## 2022-05-09 LAB
ANION GAP SERPL CALCULATED.4IONS-SCNC: 4 MMOL/L (ref 6–16)
BUN SERPL-MCNC: 36 MG/DL (ref 8–24)
CALCIUM SERPL-MCNC: 8.7 MG/DL (ref 8.5–10.1)
CHLORIDE SERPL-SCNC: 109 MMOL/L (ref 98–108)
CO2 SERPL-SCNC: 34 MMOL/L (ref 21–32)
CREAT SERPL-MCNC: 0.68 MG/DL (ref 0.6–1.2)
GLUCOSE SERPL-MCNC: 118 MG/DL (ref 70–99)
MAGNESIUM SERPL-MCNC: 2.6 MG/DL (ref 1.6–2.4)
PHOSPHATE SERPL-MCNC: 2.8 MG/DL (ref 2.5–4.9)
POTASSIUM SERPL-SCNC: 3.2 MMOL/L (ref 3.5–5.5)
SODIUM SERPL-SCNC: 147 MMOL/L (ref 136–145)

## 2022-05-09 NOTE — NUR
Pt currently in restraints, he has continued to attempt to pull out lines,
including snyder catheter and IV. He pulls away if a hand gets too close to his
face, as if he doesn't like being touched. His family confirms he doesn't like
being physically touched.
   Spoke to daughter Savannah today by phone, she reports her 2 siblings have
cancelled today, and she is now canceled for today as well. We did discuss the
extreme concerns surrounding continued use of restraints and the inability to
treat his symptoms.

## 2022-05-09 NOTE — NUR
Ethics consultation order reviewed. Medical history, prognostic indicators,
and social complexities analyzed. The principle is an 82 y/o houseless male
with a formal diagnosis of dementia, suffering normal course clinical
decline, who is requiring ongoing restraints due to unmanaged agitation and
behavioral disturbance. He is receiving nutrition peripherally and is thought
to be at high risk for aspiration. The family constellation is vacillating on
making a hospice election, which is understand by the key medical
stakeholders to be the most ethically licit pathway forward. I will confer
with palliative care and a qorum of the bioethics panel tomorrow
before solidifying my recommendation, but a reasonable preliminary judgement
would suggest that the best option is to respectfully and gently
explain to the family, that by maintaining restraints and forgoing comfort
measures, we are prolonging the inevitable and depriving the principle of his
final dignity. Additionally, it is highly problematic that he is being kept in
a position where he has no ability to defend his own airway. I think it should
be communicated to the decision makers, that sustaining this type of approach
in the hospital is not an option.
 
Thank you for this consult.
 
Jonathan Marcus, PhD, CARLOS Detail Level: Zone Initiate Treatment: OTC Rogain 2% apply to scalp twice a day for 3 months.

## 2022-05-09 NOTE — NUR
CALLED DR CHRISTIANSON TO DISCUSS LEXAPRO. PHARMACY SAYS IT IS ANVAILABLE. DR CHRISTIANSON SAID TO REQUEST MED FROM HOME IF FAMILY MEMBER HAS ACCESS.
 
UPDATE: LEFT VM WITH SOWMYA TO RETURN CALL TO DISCUSS IF LEXAPRO IS AVAILABLE
AT HOME FOR PT.

## 2022-05-09 NOTE — NUR
SHIFT SUMMARY-
PT AGITATED IN BEGINING OF SHIFT. PT MUMBLING GARGIN WHEN COMFORTED. PT
RECIEVED BED BATH AND CALMED DOWN. PT ASLEEP THROUGHOUT MOST OF SHIFT.
REPOSITIONED EVERY FEW HOURS DURING SHIFT. NO BREAKDOWN OF SKIN NOTED. PT
APPETITE POOR. PT RESTRAINTS CHECK Q2. PT WITH SIDE RAILS AND CALL IN REACH.

## 2022-05-09 NOTE — NUR
SHIFT SUMMARY
 
PATIENT AWAKE THROUGHOUT THE NIGHT, HE DOES REST WITH EYE CLOSED FOR BRIEF
PERIODS OF TIME, IT SEEMS HE IS ONLY A&O TO SELF AS HE DOES RESPOND TO HIS
NAME HOWEVER WITH HIS GARBLED SPEECH IT IS VERY DIFFICULT TO UNDERSTAND THE
PATIENT, RESTRAINTS REMAIN IN PLACE D/T AGGRESSION AND ATTEMPTING TO HIT AND
KICK STAFF WITH CARES, AS WELL AS PULLING AT LINES, GOMEZ CATHETER REMAINS IN
PLACE AND APPEARS TO BE LEAKING SLIGHTLY AT THE MEATUS, GOOD OUTPUT NOTED AT
APPROX 1500 THIS SHFIT, CLINIMIX CONTINUES TO INFUSE, VSS ON 2 L VIA NC,
PATIENT APPEARS TO BE VERY FRUSTRATED, CONFUSED AND IRRTATED WITH ALL CARE,
EDUCATION PROVIDED TO PATIENT WHICH DID NOT SEEM TO HELP

## 2022-05-10 LAB — TRIGL SERPL-MCNC: 88 MG/DL (ref 30–160)

## 2022-05-10 NOTE — NUR
SHIFT SUMMARY-
PT ASLEEP THROUGHOUT SHIFT. PT REPOSITIONED Q2 HRS. PT MOVED TO COMFORT CARE
PER DR ORDER, SEE EMAR. REMOVED MITTS/SOFT RESTRAINTS PER DR ORDERS. PT
CONFUSED BUT WILL CALM WITH CONSOLATION. PT TRANSFERRED TO NEW ROOM SAME UNIT,
SEE EMAR. MEDICATED PT AS NEEDED FOR COMFORT, PER EMAR. PT BEDFAST WITH VEST,
CALL LIGHT WITHIN REACH AND SIDERAILS UP.

## 2022-05-10 NOTE — NUR
SHIFT SUMMARY
 
PATIENT SLIGHTLY MORE ALERT TONIGHT THAN HE WAS LAST NIGHT, CONTINUES TO TRY
AND SPEAK TO STAFF HOWEVER WITH HIS GARBLED SPEECH IT MAKES IT VERY DIFFICULT
TO UNDERSTAND, STILL BECOMES AGGRESSIVE TOWARDS STAFF WITH SOME CARE, GOMEZ
CATHETER REMAINS IN PLACE, CONTINUES TO LEAK MINIMALLY AROUND THE MEATUS,
PATIENT REPONDS TO VERBAL STILUMI AND SEEMS TO BE ORIENTED TO SELF, APPEARS TO
BE EXTREMELY FRUSTRATED WITH THE RESTRAINTS, THERAPEUTIC COMMUNICATION AND
CARE PROVIDED, FREUENT REORIENTATION ALSO PROVIDED, IT IS EXTREMELY
CHALLENGING TO DO ORAL CARE WITH THE PATIENT AS HE USUSALLY BITES DOWN ON
ANYTHING YOU PUT IN HIS MOUTH, WILL CONTINUE AND COMPLETE FREQUENT ORAL CARE,
Q2 HOUR REPOSITIONING ALSO PROVIDED, PATIENT DOES MOVE HIMSELF AROUNF IN BED A
LOT HOWEVER

## 2022-05-10 NOTE — NUR
Pt is on comfort care at this time. Dr. Galindo ordered IV lorazepam for
pt, but he no longer has IV access.  The patient however, still appears
highly agitated, and they have not yet been able to remove the wrist
restraints due to him flailing his arms and kicking his legs.
   Received v/o for Lorazem po, and the RN will continue to attempt to help
calm patient.
   Palliative Care will remain involved.

## 2022-05-11 NOTE — NUR
SHIFT SUMMARY-
PT ON COMFORT CARE. PT RESTING COMFORTABLY. PT EXPRESSED VERY LITTLE
DISCOMFORT DURING REPOSITIONING, ORAL CARE, AND OTHER CARE NEEDS.
NO SKIN BREAKDOWN NOTED DURING SHIFT.
MEDICATED PT FOR COMFORT, PER EMAR.
DAUGHTER IN WITH PT AROUND 1200.
PT RESTING COMFORTABLY NOW.

## 2022-05-11 NOTE — NUR
SHIFT SUMMARY
 
PATIENT ON COMFORT CARE, PRN ROXINOL AND ATIVAN ADMINISTERED FOR PATIENT
COMFORT, PATIENT HAS THICK SECREATIONS SCOPOLAMINE PATCH IN PLACE, FREQUENT
SUCTIONING AND ORAL CARE PROVIDED, FREQUENT REPOSITIONING PROVIDED
FOR COMFORT, POSEY REMOVED THIS AM AS PATIENT IS CALM AND APPEARS
COMFORTABLE AND HAS NOT BEEN IMPULSIVE THROUGHOUT THE NIGHT

## 2022-05-11 NOTE — NUR
Multiple vists pt much morecomfotable and out of posey. pt more frail and
cachectic. Brething is more even and arms relaxed. Will continue to monitor.
Daughter was in to visit pt.

## 2022-05-12 NOTE — NUR
DAUGHTER AND PTS WIFE IN TO SEE PT THIS MORNING.  BELONGINGS HOME WITH FAMILY
OTHER THAN A [employer] AND A FEW DOLLARS AND A PIECE OF MAIL WENT WITH PT
TO MORTUARY.  [facility]  DIRECTORS HERE TO PICK PT UP AT 1125.  TO
[facility].

## 2022-05-12 NOTE — NUR
Pt passed away this morning at 0452. Rachael Nuñez and Mellissa called. No
answer, messages left with update and call back number. Awaiting return phone
call.